# Patient Record
Sex: MALE | Race: BLACK OR AFRICAN AMERICAN | NOT HISPANIC OR LATINO | Employment: UNEMPLOYED | URBAN - METROPOLITAN AREA
[De-identification: names, ages, dates, MRNs, and addresses within clinical notes are randomized per-mention and may not be internally consistent; named-entity substitution may affect disease eponyms.]

---

## 2017-09-18 ENCOUNTER — HOSPITAL ENCOUNTER (EMERGENCY)
Facility: HOSPITAL | Age: 37
Discharge: HOME | End: 2017-09-19
Attending: EMERGENCY MEDICINE
Payer: MEDICAID

## 2017-09-18 DIAGNOSIS — S01.311A LACERATION OF EAR CANAL, RIGHT, INITIAL ENCOUNTER: Primary | ICD-10-CM

## 2017-09-18 PROCEDURE — 99283 EMERGENCY DEPT VISIT LOW MDM: CPT

## 2017-09-18 PROCEDURE — 99283 EMERGENCY DEPT VISIT LOW MDM: CPT | Mod: ,,, | Performed by: EMERGENCY MEDICINE

## 2017-09-18 RX ORDER — DOCUSATE SODIUM 50 MG/5ML
100 LIQUID ORAL ONCE
Status: COMPLETED | OUTPATIENT
Start: 2017-09-19 | End: 2017-09-18

## 2017-09-18 RX ADMIN — DOCUSATE SODIUM 100 MG: 50 LIQUID ORAL at 11:09

## 2017-09-19 VITALS
OXYGEN SATURATION: 99 % | BODY MASS INDEX: 21.66 KG/M2 | HEART RATE: 82 BPM | HEIGHT: 65 IN | DIASTOLIC BLOOD PRESSURE: 88 MMHG | SYSTOLIC BLOOD PRESSURE: 138 MMHG | WEIGHT: 130 LBS | RESPIRATION RATE: 18 BRPM | TEMPERATURE: 98 F

## 2017-09-19 PROCEDURE — 25000003 PHARM REV CODE 250: Performed by: STUDENT IN AN ORGANIZED HEALTH CARE EDUCATION/TRAINING PROGRAM

## 2017-09-19 NOTE — ED PROVIDER NOTES
Encounter Date: 9/18/2017    SCRIBE #1 NOTE: I, Nerissa Burton, am scribing for, and in the presence of,  Dr. Schaefer. I have scribed the following portions of the note - the Resident attestation.       History     Chief Complaint   Patient presents with    Otitis Media     c/o right ear pain since yesterday.      HPI   37 y.o. M p/w right ear pain x2 days. Pain does not radiate, it is sharp. He tried to remove some ear wax with a q-tip yesterday, and thinks that he may have injured the ear.  Denies any changes in hearing, discharge, tinnitus, fevers, vision or balance issues, headache.     Review of patient's allergies indicates:  No Known Allergies  History reviewed. No pertinent past medical history.  History reviewed. No pertinent surgical history.  No family history on file.  Social History   Substance Use Topics    Smoking status: Never Smoker    Smokeless tobacco: Not on file    Alcohol use No     Review of Systems   Constitutional: Negative for chills, diaphoresis and fever.   HENT: Positive for ear pain. Negative for congestion, ear discharge, hearing loss, rhinorrhea, sore throat and trouble swallowing.    Eyes: Negative for pain and visual disturbance.   Respiratory: Negative for cough and chest tightness.    Cardiovascular: Negative for chest pain and palpitations.   Gastrointestinal: Negative for blood in stool and constipation.   Genitourinary: Negative for dysuria, frequency and hematuria.   Musculoskeletal: Negative for back pain and neck stiffness.   Skin: Negative for rash and wound.   Neurological: Negative for seizures, syncope and weakness.   All other systems reviewed and are negative.      Physical Exam     Initial Vitals [09/18/17 2139]   BP Pulse Resp Temp SpO2   127/82 96 18 98.3 °F (36.8 °C) 97 %      MAP       97         Physical Exam    Constitutional: He appears well-developed. He is not diaphoretic. No distress.   HENT:   Head: Normocephalic and atraumatic.   Nose: Nose normal.    Mouth/Throat: Oropharynx is clear and moist.   Right ear with blood in canal. There is cerumen impaction in both ears.    Eyes: Conjunctivae and EOM are normal. Pupils are equal, round, and reactive to light.   Neck: Normal range of motion. Neck supple.   Cardiovascular: Intact distal pulses.   Pulses:       Dorsalis pedis pulses are 2+ on the right side, and 2+ on the left side.   Pulmonary/Chest: Breath sounds normal. No respiratory distress. He exhibits no tenderness.   Abdominal: Soft. Normal appearance. He exhibits no distension.   Neurological: He is alert and oriented to person, place, and time. He has normal strength. No cranial nerve deficit or sensory deficit. He exhibits normal muscle tone.   Skin: Skin is warm and dry. No pallor.         ED Course   Procedures  Labs Reviewed - No data to display     HO-II MDM  37 y.o. M p/w right ear pain x2 days. No other sx's. Phys exam showed blood and cerumen.   DDX includes: cerumen impaction, perforated TM, otitis media, otitis externa.  Will wash out ear and attempt to remove cerumen.    Pt is aware of plan and is amenable.     Jose Albarran M.D.  [facility] EMERGENCY MEDICINE PGY-2  11:29 PM 09/19/2017    HO-II UPDATE  Cerumen not able to be completely removed. Right ear showed bleeding in the external canal, inferior dorsal ~7 o'clock location.   At this time, we have low concern for TM perforation. Will give pt contact info for ENT for more thorough evaluation.   Pt is aware of plan and is amenable.     Jose Albarran M.D.  [facility] EMERGENCY MEDICINE PGY-2  1:02 AM 09/19/2017         Medical Decision Making:   History:   Old Medical Records: I decided to obtain old medical records.            Scribe Attestation:   Scribe #1: I performed the above scribed service and the documentation accurately describes the services I performed. I attest to the accuracy of the note.    Attending Attestation:   Physician Attestation Statement for Resident:  As the supervising MD    Physician Attestation Statement: I have personally seen and examined this patient.   I agree with the above history. -: 38 yo M presents with 2 days R ear fullness and decreased hearing. He tried to clean his ear and noticed blood uncertain of last tetanus. Pt has bilateral cerumen impaction w/ 3mm superficial laceration along inferior aspect of R auditory canal. We attempted cerumen disimpaction with colace with some success in the ED but pt will need definitive management in ENT clinic given close proximity of laceration to the TM. I do not believe there to be TM perforation but unable to completely visualize TM. Pt will f/u in ENT clinic. Tetanus updated.   As the supervising MD I agree with the above PE.    As the supervising MD I agree with the above treatment, course, plan, and disposition.          Physician Attestation for Scribe:  Physician Attestation Statement for Scribe #1: I, Dr. Schaefer, reviewed documentation, as scribed by Nerissa Burton in my presence, and it is both accurate and complete.                 ED Course      Clinical Impression:   The encounter diagnosis was Laceration of ear canal, right, initial encounter.                           Jose Albarran MD  Resident  09/19/17 0107       Evan Schaefer MD  09/19/17 0216

## 2017-09-19 NOTE — ED TRIAGE NOTES
Pt presents to ED c/o decreased hearing and pressure in right ear. Started today. Denies any PMH.     LOC: Patient name and date of birth verified.  The patient is awake, alert and aware of environment with an appropriate affect, the patient is oriented x 3 and speaking appropriately.  Pt in NAD.    APPEARANCE: Patient resting comfortably and in no acute distress, patient is clean and well groomed, patient's clothing is properly fastened.  SKIN: The skin is warm and dry, color consistent with ethnicity, patient has normal skin turgor and moist mucus membranes, skin intact, no breakdown or brusing noted.  MUSCULOSKELETAL: Patient moving all extremities well, no obvious swelling or deformities noted.  RESPIRATORY: Airway is open and patent, respirations are spontaneous, patient has a normal effort and rate, no accessory muscle use noted.  CARDIAC: Patient has a normal rate and rhythm, no periphreal edema noted, capillary refill < 3 seconds.  ABDOMEN: Soft and non tender to palpation, no distention noted. Bowel sounds present in all four quadrants.  NEUROLOGIC: Eyes open spontaneously, behavior appropriate to situation, follows commands, facial expression symmetrical, bilateral hand grasp equal and even, purposeful motor response noted, normal sensation in all extremities when touched with a finger.;

## 2017-09-21 ENCOUNTER — TELEPHONE (OUTPATIENT)
Dept: OTOLARYNGOLOGY | Facility: CLINIC | Age: 37
End: 2017-09-21

## 2017-09-21 NOTE — TELEPHONE ENCOUNTER
----- Message from Noemy Robertson sent at 9/19/2017  2:06 PM CDT -----  Contact: Ms Solares/ wife   [phone]  Patient went to ER for cut in the ear  pt advised to see  ENT.   Patient need appointment  For today   Please call Ms Solares/ wife   [phone]

## 2018-02-15 ENCOUNTER — HOSPITAL ENCOUNTER (EMERGENCY)
Facility: HOSPITAL | Age: 38
Discharge: HOME | End: 2018-02-15
Attending: EMERGENCY MEDICINE
Payer: MEDICAID

## 2018-02-15 VITALS
HEIGHT: 65 IN | RESPIRATION RATE: 18 BRPM | SYSTOLIC BLOOD PRESSURE: 120 MMHG | OXYGEN SATURATION: 99 % | DIASTOLIC BLOOD PRESSURE: 71 MMHG | HEART RATE: 70 BPM | BODY MASS INDEX: 21.66 KG/M2 | TEMPERATURE: 98 F | WEIGHT: 130 LBS

## 2018-02-15 DIAGNOSIS — R40.20 LOC (LOSS OF CONSCIOUSNESS): ICD-10-CM

## 2018-02-15 LAB
ALBUMIN SERPL BCP-MCNC: 3.6 G/DL
ALP SERPL-CCNC: 77 U/L
ALT SERPL W/O P-5'-P-CCNC: 14 U/L
AMPHET+METHAMPHET UR QL: NEGATIVE
ANION GAP SERPL CALC-SCNC: 7 MMOL/L
AST SERPL-CCNC: 19 U/L
BARBITURATES UR QL SCN>200 NG/ML: NEGATIVE
BASOPHILS # BLD AUTO: 0.03 K/UL
BASOPHILS NFR BLD: 0.4 %
BENZODIAZ UR QL SCN>200 NG/ML: NEGATIVE
BILIRUB SERPL-MCNC: 0.4 MG/DL
BUN SERPL-MCNC: 8 MG/DL
BZE UR QL SCN: NEGATIVE
CALCIUM SERPL-MCNC: 9.2 MG/DL
CANNABINOIDS UR QL SCN: NEGATIVE
CHLORIDE SERPL-SCNC: 105 MMOL/L
CO2 SERPL-SCNC: 28 MMOL/L
CREAT SERPL-MCNC: 0.9 MG/DL
CREAT UR-MCNC: 41 MG/DL
DIFFERENTIAL METHOD: ABNORMAL
EOSINOPHIL # BLD AUTO: 0.3 K/UL
EOSINOPHIL NFR BLD: 4.4 %
ERYTHROCYTE [DISTWIDTH] IN BLOOD BY AUTOMATED COUNT: 13 %
EST. GFR  (AFRICAN AMERICAN): >60 ML/MIN/1.73 M^2
EST. GFR  (NON AFRICAN AMERICAN): >60 ML/MIN/1.73 M^2
GLUCOSE SERPL-MCNC: 103 MG/DL
HCT VFR BLD AUTO: 37 %
HGB BLD-MCNC: 12.5 G/DL
IMM GRANULOCYTES # BLD AUTO: 0.02 K/UL
IMM GRANULOCYTES NFR BLD AUTO: 0.3 %
LYMPHOCYTES # BLD AUTO: 2.2 K/UL
LYMPHOCYTES NFR BLD: 28.8 %
MCH RBC QN AUTO: 28.7 PG
MCHC RBC AUTO-ENTMCNC: 33.8 G/DL
MCV RBC AUTO: 85 FL
METHADONE UR QL SCN>300 NG/ML: NEGATIVE
MONOCYTES # BLD AUTO: 0.6 K/UL
MONOCYTES NFR BLD: 7.8 %
NEUTROPHILS # BLD AUTO: 4.5 K/UL
NEUTROPHILS NFR BLD: 58.3 %
NRBC BLD-RTO: 0 /100 WBC
OPIATES UR QL SCN: NEGATIVE
PCP UR QL SCN>25 NG/ML: NEGATIVE
PLATELET # BLD AUTO: 183 K/UL
PMV BLD AUTO: 12.1 FL
POTASSIUM SERPL-SCNC: 3.8 MMOL/L
PROT SERPL-MCNC: 6.9 G/DL
RBC # BLD AUTO: 4.35 M/UL
SODIUM SERPL-SCNC: 140 MMOL/L
TOXICOLOGY INFORMATION: NORMAL
WBC # BLD AUTO: 7.68 K/UL

## 2018-02-15 PROCEDURE — 93010 ELECTROCARDIOGRAM REPORT: CPT | Mod: ,,, | Performed by: INTERNAL MEDICINE

## 2018-02-15 PROCEDURE — 99284 EMERGENCY DEPT VISIT MOD MDM: CPT | Mod: 25

## 2018-02-15 PROCEDURE — 80307 DRUG TEST PRSMV CHEM ANLYZR: CPT

## 2018-02-15 PROCEDURE — 99284 EMERGENCY DEPT VISIT MOD MDM: CPT | Mod: ,,, | Performed by: NURSE PRACTITIONER

## 2018-02-15 PROCEDURE — 85025 COMPLETE CBC W/AUTO DIFF WBC: CPT

## 2018-02-15 PROCEDURE — 80053 COMPREHEN METABOLIC PANEL: CPT

## 2018-02-15 PROCEDURE — 93005 ELECTROCARDIOGRAM TRACING: CPT

## 2018-02-15 NOTE — ED NOTES
Received pt  Pt awake, alert, and oriented x4  Pt reports syncope episode, about 2 days ago  Pt has no recollection of the event, but his wife says it lasted 10 minutes, and denies injury  Pt placed on cardiac monitor and continuous pulse ox  Pt pending MSE  Will continue to monitor

## 2018-02-15 NOTE — ED PROVIDER NOTES
Encounter Date: 2/15/2018    SCRIBE #1 NOTE: I, Silvia Brady, am scribing for, and in the presence of,  Dr. Hopkins . I have scribed the following portions of the note - the EKG reading.       History     Chief Complaint   Patient presents with    Loss of Consciousness     passed out 2 days ago, wife thinks speech is slower, ? seizure     The history is provided by the patient and the spouse.   Loss of Consciousness   This is a new problem. The current episode started 2 days ago. Episode frequency: once. The problem has been resolved. Pertinent negatives include no chest pain, no abdominal pain, no headaches and no shortness of breath. Nothing aggravates the symptoms. Nothing relieves the symptoms. He has tried nothing for the symptoms.     Review of patient's allergies indicates:  No Known Allergies  History reviewed. No pertinent past medical history.  History reviewed. No pertinent surgical history.  History reviewed. No pertinent family history.  Social History   Substance Use Topics    Smoking status: Never Smoker    Smokeless tobacco: Never Used    Alcohol use No     Review of Systems   Constitutional: Negative for fever.   HENT: Negative for sore throat.    Respiratory: Negative for shortness of breath.    Cardiovascular: Positive for syncope. Negative for chest pain.   Gastrointestinal: Negative for abdominal pain and nausea.   Genitourinary: Negative for dysuria.   Musculoskeletal: Negative for back pain.   Skin: Negative for rash.   Neurological: Positive for syncope. Negative for dizziness, tremors, facial asymmetry, speech difficulty, weakness, light-headedness, numbness and headaches.   Hematological: Does not bruise/bleed easily.       Physical Exam     Initial Vitals [02/15/18 1500]   BP Pulse Resp Temp SpO2   115/67 80 18 98.1 °F (36.7 °C) 99 %      MAP       83         Physical Exam    Nursing note and vitals reviewed.  Constitutional: Vital signs are normal. He appears well-developed and  well-nourished.  Non-toxic appearance.   HENT:   Head: Normocephalic and atraumatic.   Right Ear: Hearing and external ear normal.   Left Ear: Hearing and external ear normal.   Nose: Nose normal.   Mouth/Throat: Oropharynx is clear and moist.   Eyes: Conjunctivae are normal. Pupils are equal, round, and reactive to light. Right eye exhibits nystagmus. Left eye exhibits nystagmus. Right pupil is round and reactive. Left pupil is round and reactive. Pupils are equal.   Neck: Full passive range of motion without pain. Neck supple. No neck rigidity.   Cardiovascular: Normal rate and normal pulses.   Pulses:       Radial pulses are 2+ on the right side, and 2+ on the left side.        Dorsalis pedis pulses are 2+ on the right side, and 2+ on the left side.   Pulmonary/Chest: Effort normal and breath sounds normal. No respiratory distress. He has no decreased breath sounds. He has no wheezes.   Abdominal: Soft. Bowel sounds are normal. There is no tenderness. There is no rigidity, no rebound and no guarding.   Musculoskeletal: Normal range of motion.   Neurological: He is alert and oriented to person, place, and time. He has normal strength. No cranial nerve deficit or sensory deficit. He exhibits normal muscle tone. He displays no seizure activity. Gait normal. GCS eye subscore is 4. GCS verbal subscore is 5. GCS motor subscore is 6.   Skin: Skin is warm, dry and intact. Capillary refill takes less than 2 seconds. No rash noted.   Psychiatric: He has a normal mood and affect. His speech is normal and behavior is normal. Judgment and thought content normal. Cognition and memory are normal.         ED Course   Procedures  Labs Reviewed   CBC W/ AUTO DIFFERENTIAL - Abnormal; Notable for the following:        Result Value    RBC 4.35 (*)     Hemoglobin 12.5 (*)     Hematocrit 37.0 (*)     All other components within normal limits   COMPREHENSIVE METABOLIC PANEL - Abnormal; Notable for the following:     Anion Gap 7 (*)      "All other components within normal limits   DRUG SCREEN PANEL, URINE EMERGENCY    Narrative:     yellow & gray tubes     EKG Readings: (Independently Interpreted)   Sinus bradycardia rate of 55. No ischemic changes.           Medical Decision Making:   History:   Old Medical Records: I decided to obtain old medical records.  Differential Diagnosis:   Seizure  Syncopal Episode  Substance Abuse  Hypoglycemia  Dysrhythmia  Infection  Intracranial Injury  Independently Interpreted Test(s):   I have ordered and independently interpreted EKG Reading(s) - see prior notes  Clinical Tests:   Lab Tests: Ordered and Reviewed  Radiological Study: Ordered and Reviewed  Medical Tests: Ordered and Reviewed  ED Management:  Patient is a 37-year-old male with no pertinent past medical history who presents to the ED with his spouse complaining of a loss of consciousness that occurred 2 days ago. The patient is afebrile, non-toxic appearing, and hemodynamically stable. Patient's spouse states that they were at home and he was sitting down when she noticed "his eyes roll back in his head and his whole body started shaking".  She reports the episode lasted approximately 5-10 minutes followed by 2 episodes of vomiting when the patient regained consciousness.  She further reports that he was "very sleepy" afterward.  They both deny any similar episodes in the past.  Deny recreational substance use.  Patient denies pain and states that he "feels fine".  Wife states that she believes he has been "acting slow and not quite right" since the episode.     Physical exam is notable for some bilateral horizontal nystagmus.  Physical exam is otherwise benign.    ECG does not demonstrate acute ischemia or abnormality. I independently reviewed and interpreted labs which are unremarkable.  UDS is negative.  CT of head is negative.    Based on history and physical exam, as well as diagnostic results, I considered but do not suspect hypoglycemia, " dysrhythmia, infection, or intracranial injury.  Clinical presentation suggests possible seizure versus syncopal episode.  Patient will be discharged with instructions to follow up with neurology.  Return precautions given. Case discussed with supervising physician who agrees with plan.            Scribe Attestation:   Scribe #1: I performed the above scribed service and the documentation accurately describes the services I performed. I attest to the accuracy of the note.    Attending Attestation:     Physician Attestation Statement for NP/PA:   I reviewed the chart but I did not personally examine the patient. The face to face encounter was performed by the NP/PA.    Other NP/PA Attestation Additions:      Medical Decision Making: New onset seizure vs syncope    Labs and CT head normal.  Will not start on AEDs.  EKG negative.  Pt to follow up with neurology       Physician Attestation for Scribe:      Comments: I, Dr. Karis Hopkins, personally performed the services described in this documentation. All medical record entries made by the scribe were at my direction and in my presence.  I have reviewed the chart and agree that the record reflects my personal performance and is accurate and complete. Karis Hopkins MD.                 Clinical Impression:   The encounter diagnosis was LOC (loss of consciousness).    Disposition:   Disposition: Discharged  Condition: Stable                        Zenia Guzman NP  02/15/18 1926       Karis Hopkins MD  02/16/18 7581

## 2018-07-11 ENCOUNTER — HOSPITAL ENCOUNTER (EMERGENCY)
Facility: HOSPITAL | Age: 38
Discharge: HOME | End: 2018-07-11
Attending: EMERGENCY MEDICINE
Payer: MEDICAID

## 2018-07-11 VITALS
BODY MASS INDEX: 21.66 KG/M2 | HEIGHT: 65 IN | WEIGHT: 130 LBS | SYSTOLIC BLOOD PRESSURE: 114 MMHG | TEMPERATURE: 98 F | HEART RATE: 73 BPM | DIASTOLIC BLOOD PRESSURE: 78 MMHG | OXYGEN SATURATION: 98 % | RESPIRATION RATE: 16 BRPM

## 2018-07-11 DIAGNOSIS — H61.23 BILATERAL IMPACTED CERUMEN: Primary | ICD-10-CM

## 2018-07-11 PROCEDURE — 99283 EMERGENCY DEPT VISIT LOW MDM: CPT | Mod: 25

## 2018-07-11 PROCEDURE — 25000003 PHARM REV CODE 250: Performed by: EMERGENCY MEDICINE

## 2018-07-11 PROCEDURE — 69209 REMOVE IMPACTED EAR WAX UNI: CPT

## 2018-07-11 PROCEDURE — 69210 REMOVE IMPACTED EAR WAX UNI: CPT | Mod: 50

## 2018-07-11 RX ADMIN — CARBAMIDE PEROXIDE 6.5% 10 DROP: 6.5 LIQUID AURICULAR (OTIC) at 07:07

## 2018-07-12 NOTE — ED NOTES
Patient identifiers for Charo Padron checked and correct.    LOC: The patient is awake, alert and aware of environment with an appropriate affect, the patient is oriented x 3 and speaking appropriately.  APPEARANCE: Patient resting comfortably and in no acute distress, patient is clean and well groomed, patient's clothing are properly fastened.  SKIN: The skin is warm and dry, patient has age appropriate skin turgor and moist mucus membranes, skin intact, no breakdown or bruising noted.  MUSCULOSKELETAL: Patient moving all extremities well, no obvious swelling or deformities noted.  RESPIRATORY: Airway is open and patent, respirations are spontaneous, patient has a normal effort and rate, no accessory muscle use noted.  Clear breath sounds bilaterally.  CARDIAC: Patient has a normal rate and rhythm, no periphreal edema noted, capillary refill < 3 seconds.  ABDOMEN: Soft and non tender to palpation, no distention noted.  Normoactive bowel sounds x4.  NEUROLOGIC: PERRL, facial expression is symmetrical, bilateral hand grasp equal and even, normal sensation in all extremities when touched with a finger.

## 2018-07-12 NOTE — ED PROVIDER NOTES
Encounter Date: 7/11/2018    SCRIBE #1 NOTE: I, Garry Lindsay, am scribing for, and in the presence of,  Dr. Olson. I have scribed the entire note.       History     Chief Complaint   Patient presents with    Ears Stopped Up     bilateral, onset today     Charo Padron is a 37 y.o. male who  has no past medical history on file.    The patient presents to the ED due to bilateral ears being stopped up that began today.  He states that he has had ear infections before but does not know the cause. He denies any sore throat, congestion, or fever.       The history is provided by the patient.     Review of patient's allergies indicates:  No Known Allergies  History reviewed. No pertinent past medical history.  History reviewed. No pertinent surgical history.  History reviewed. No pertinent family history.  Social History   Substance Use Topics    Smoking status: Never Smoker    Smokeless tobacco: Never Used    Alcohol use No     Review of Systems   Constitutional: Negative for fever.   HENT: Positive for ear pain. Negative for congestion and sore throat.        Physical Exam     Initial Vitals [07/11/18 1811]   BP Pulse Resp Temp SpO2   116/75 68 16 98.3 °F (36.8 °C) 100 %      MAP       --         Physical Exam    Nursing note and vitals reviewed.  Constitutional: He appears well-developed and well-nourished. He is not diaphoretic. No distress.   HENT:   Head: Normocephalic and atraumatic.   Mouth/Throat: Oropharynx is clear and moist.   (+) Bilateral ear occlusion   Eyes: EOM are normal. Pupils are equal, round, and reactive to light.   Neck: No tracheal deviation present.   Cardiovascular: Normal rate, regular rhythm, normal heart sounds and intact distal pulses.   Pulmonary/Chest: Breath sounds normal. No stridor. No respiratory distress.   Abdominal: Soft. He exhibits no distension and no mass. There is no tenderness.   Musculoskeletal: Normal range of motion. He exhibits no edema.   Neurological: He is alert  and oriented to person, place, and time. No cranial nerve deficit or sensory deficit.   Skin: Skin is warm and dry. Capillary refill takes less than 2 seconds. No rash noted.   Psychiatric: He has a normal mood and affect. His behavior is normal. Thought content normal.         ED Course   Ear Wax Removal  Date/Time: 7/11/2018 8:16 PM  Performed by: GUERRERO OLSON  Authorized by: GUERRERO OLSON     Anesthesia:  Local Anesthetic: none  Ceruminolytics applied prior to the procedure.  Medication Used: Cerumenex.  Location details: both ears  Procedure type: irrigation Cerumen Removal Results: Cerumen completely removed.  Patient tolerance: Patient tolerated the procedure well with no immediate complications        Labs Reviewed - No data to display       Imaging Results    None                               Clinical Impression:     1. Bilateral impacted cerumen         I, Dr. Guerrero Olson, personally performed the services described in this documentation.   All medical record entries made by the scribe were at my direction and in my presence.   I have reviewed the chart and agree that the record is accurate and complete.   Guerrero Olson MD.  10:03 PM 07/11/2018                     Guerrero Olson MD  07/11/18 4308

## 2019-02-08 ENCOUNTER — OCCUPATIONAL HEALTH (OUTPATIENT)
Dept: URGENT CARE | Facility: CLINIC | Age: 39
End: 2019-02-08
Payer: MEDICAID

## 2019-02-08 DIAGNOSIS — Z02.83 ENCOUNTER FOR DRUG SCREENING: Primary | ICD-10-CM

## 2019-02-08 PROCEDURE — 80305 PR DRUG SCREEN - 1: ICD-10-PCS | Mod: S$GLB,,, | Performed by: PHYSICIAN ASSISTANT

## 2019-02-08 PROCEDURE — 80305 DRUG TEST PRSMV DIR OPT OBS: CPT | Mod: S$GLB,,, | Performed by: PHYSICIAN ASSISTANT

## 2019-04-18 ENCOUNTER — ANESTHESIA EVENT (OUTPATIENT)
Dept: SURGERY | Facility: HOSPITAL | Age: 39
End: 2019-04-18
Payer: MEDICAID

## 2019-04-18 ENCOUNTER — ANESTHESIA (OUTPATIENT)
Dept: SURGERY | Facility: HOSPITAL | Age: 39
End: 2019-04-18
Payer: MEDICAID

## 2019-04-18 ENCOUNTER — HOSPITAL ENCOUNTER (OUTPATIENT)
Facility: HOSPITAL | Age: 39
Discharge: HOME | End: 2019-04-20
Attending: EMERGENCY MEDICINE | Admitting: ORTHOPAEDIC SURGERY
Payer: MEDICAID
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
## 2019-04-18 DIAGNOSIS — S02.2XXA CLOSED FRACTURE OF NASAL BONE, INITIAL ENCOUNTER: ICD-10-CM

## 2019-04-18 DIAGNOSIS — S92.002A CLOSED DISPLACED FRACTURE OF LEFT CALCANEUS, UNSPECIFIED PORTION OF CALCANEUS, INITIAL ENCOUNTER: ICD-10-CM

## 2019-04-18 DIAGNOSIS — S82.202A CLOSED FRACTURE OF LEFT TIBIA AND FIBULA, INITIAL ENCOUNTER: ICD-10-CM

## 2019-04-18 DIAGNOSIS — S01.511A LIP LACERATION, INITIAL ENCOUNTER: ICD-10-CM

## 2019-04-18 DIAGNOSIS — S82.402A CLOSED FRACTURE OF LEFT TIBIA AND FIBULA, INITIAL ENCOUNTER: ICD-10-CM

## 2019-04-18 DIAGNOSIS — S82.842A CLOSED BIMALLEOLAR FRACTURE OF LEFT ANKLE, INITIAL ENCOUNTER: ICD-10-CM

## 2019-04-18 PROBLEM — S82.843A BIMALLEOLAR ANKLE FRACTURE: Status: ACTIVE | Noted: 2019-04-18

## 2019-04-18 LAB
ALBUMIN SERPL BCP-MCNC: 3.5 G/DL (ref 3.5–5.2)
ALP SERPL-CCNC: 77 U/L (ref 55–135)
ALT SERPL W/O P-5'-P-CCNC: 30 U/L (ref 10–44)
ANION GAP SERPL CALC-SCNC: 7 MMOL/L (ref 8–16)
AST SERPL-CCNC: 31 U/L (ref 10–40)
BASOPHILS # BLD AUTO: 0.03 K/UL (ref 0–0.2)
BASOPHILS NFR BLD: 0.2 % (ref 0–1.9)
BILIRUB SERPL-MCNC: 0.4 MG/DL (ref 0.1–1)
BUN SERPL-MCNC: 14 MG/DL (ref 6–20)
CALCIUM SERPL-MCNC: 9.4 MG/DL (ref 8.7–10.5)
CHLORIDE SERPL-SCNC: 107 MMOL/L (ref 95–110)
CO2 SERPL-SCNC: 25 MMOL/L (ref 23–29)
CREAT SERPL-MCNC: 1 MG/DL (ref 0.5–1.4)
DIFFERENTIAL METHOD: ABNORMAL
EOSINOPHIL # BLD AUTO: 0.2 K/UL (ref 0–0.5)
EOSINOPHIL NFR BLD: 1.5 % (ref 0–8)
ERYTHROCYTE [DISTWIDTH] IN BLOOD BY AUTOMATED COUNT: 13 % (ref 11.5–14.5)
EST. GFR  (AFRICAN AMERICAN): >60 ML/MIN/1.73 M^2
EST. GFR  (NON AFRICAN AMERICAN): >60 ML/MIN/1.73 M^2
GLUCOSE SERPL-MCNC: 128 MG/DL (ref 70–110)
HCT VFR BLD AUTO: 37.8 % (ref 40–54)
HGB BLD-MCNC: 12.3 G/DL (ref 14–18)
IMM GRANULOCYTES # BLD AUTO: 0.07 K/UL (ref 0–0.04)
IMM GRANULOCYTES NFR BLD AUTO: 0.5 % (ref 0–0.5)
LYMPHOCYTES # BLD AUTO: 2.8 K/UL (ref 1–4.8)
LYMPHOCYTES NFR BLD: 19.6 % (ref 18–48)
MCH RBC QN AUTO: 29.4 PG (ref 27–31)
MCHC RBC AUTO-ENTMCNC: 32.5 G/DL (ref 32–36)
MCV RBC AUTO: 90 FL (ref 82–98)
MONOCYTES # BLD AUTO: 0.8 K/UL (ref 0.3–1)
MONOCYTES NFR BLD: 5.7 % (ref 4–15)
NEUTROPHILS # BLD AUTO: 10.4 K/UL (ref 1.8–7.7)
NEUTROPHILS NFR BLD: 72.5 % (ref 38–73)
NRBC BLD-RTO: 0 /100 WBC
PLATELET # BLD AUTO: 245 K/UL (ref 150–350)
PMV BLD AUTO: 11.2 FL (ref 9.2–12.9)
POCT GLUCOSE: 122 MG/DL (ref 70–110)
POTASSIUM SERPL-SCNC: 3.7 MMOL/L (ref 3.5–5.1)
PROT SERPL-MCNC: 7 G/DL (ref 6–8.4)
RBC # BLD AUTO: 4.19 M/UL (ref 4.6–6.2)
SODIUM SERPL-SCNC: 139 MMOL/L (ref 136–145)
WBC # BLD AUTO: 14.35 K/UL (ref 3.9–12.7)

## 2019-04-18 PROCEDURE — 99900035 HC TECH TIME PER 15 MIN (STAT)

## 2019-04-18 PROCEDURE — 27810 PR CLOSED TX BIMALLEOLAR ANKLE FRACTURE W MANIP: ICD-10-PCS | Mod: 51,LT,, | Performed by: ORTHOPAEDIC SURGERY

## 2019-04-18 PROCEDURE — 63600175 PHARM REV CODE 636 W HCPCS: Performed by: NURSE ANESTHETIST, CERTIFIED REGISTERED

## 2019-04-18 PROCEDURE — 71000039 HC RECOVERY, EACH ADD'L HOUR: Performed by: ORTHOPAEDIC SURGERY

## 2019-04-18 PROCEDURE — 63600175 PHARM REV CODE 636 W HCPCS: Performed by: PHYSICIAN ASSISTANT

## 2019-04-18 PROCEDURE — 25000003 PHARM REV CODE 250: Performed by: PHYSICIAN ASSISTANT

## 2019-04-18 PROCEDURE — D9220A PRA ANESTHESIA: Mod: CRNA,,, | Performed by: NURSE ANESTHETIST, CERTIFIED REGISTERED

## 2019-04-18 PROCEDURE — D9220A PRA ANESTHESIA: ICD-10-PCS | Mod: CRNA,,, | Performed by: NURSE ANESTHETIST, CERTIFIED REGISTERED

## 2019-04-18 PROCEDURE — 99291 CRITICAL CARE FIRST HOUR: CPT | Mod: 25

## 2019-04-18 PROCEDURE — D9220A PRA ANESTHESIA: Mod: ANES,,, | Performed by: ANESTHESIOLOGY

## 2019-04-18 PROCEDURE — 36000708 HC OR TIME LEV III 1ST 15 MIN: Performed by: ORTHOPAEDIC SURGERY

## 2019-04-18 PROCEDURE — 94761 N-INVAS EAR/PLS OXIMETRY MLT: CPT

## 2019-04-18 PROCEDURE — 36000709 HC OR TIME LEV III EA ADD 15 MIN: Performed by: ORTHOPAEDIC SURGERY

## 2019-04-18 PROCEDURE — 85025 COMPLETE CBC W/AUTO DIFF WBC: CPT

## 2019-04-18 PROCEDURE — C1713 ANCHOR/SCREW BN/BN,TIS/BN: HCPCS | Performed by: ORTHOPAEDIC SURGERY

## 2019-04-18 PROCEDURE — 96361 HYDRATE IV INFUSION ADD-ON: CPT | Mod: 59

## 2019-04-18 PROCEDURE — 27201423 OPTIME MED/SURG SUP & DEVICES STERILE SUPPLY: Performed by: ORTHOPAEDIC SURGERY

## 2019-04-18 PROCEDURE — 99291 PR CRITICAL CARE, E/M 30-74 MINUTES: ICD-10-PCS | Mod: 25,,, | Performed by: EMERGENCY MEDICINE

## 2019-04-18 PROCEDURE — 80053 COMPREHEN METABOLIC PANEL: CPT

## 2019-04-18 PROCEDURE — 99499 UNLISTED E&M SERVICE: CPT | Mod: ,,, | Performed by: OTOLARYNGOLOGY

## 2019-04-18 PROCEDURE — 96375 TX/PRO/DX INJ NEW DRUG ADDON: CPT

## 2019-04-18 PROCEDURE — 01480 ANES OPEN PX LOWER L/A/F NOS: CPT | Performed by: ORTHOPAEDIC SURGERY

## 2019-04-18 PROCEDURE — 99291 CRITICAL CARE FIRST HOUR: CPT | Mod: 25,,, | Performed by: EMERGENCY MEDICINE

## 2019-04-18 PROCEDURE — 27810 TREATMENT OF ANKLE FRACTURE: CPT | Mod: 51,LT,, | Performed by: ORTHOPAEDIC SURGERY

## 2019-04-18 PROCEDURE — 37000008 HC ANESTHESIA 1ST 15 MINUTES: Performed by: ORTHOPAEDIC SURGERY

## 2019-04-18 PROCEDURE — 63600175 PHARM REV CODE 636 W HCPCS: Performed by: EMERGENCY MEDICINE

## 2019-04-18 PROCEDURE — 96374 THER/PROPH/DIAG INJ IV PUSH: CPT

## 2019-04-18 PROCEDURE — 37000009 HC ANESTHESIA EA ADD 15 MINS: Performed by: ORTHOPAEDIC SURGERY

## 2019-04-18 PROCEDURE — 25000003 PHARM REV CODE 250: Performed by: STUDENT IN AN ORGANIZED HEALTH CARE EDUCATION/TRAINING PROGRAM

## 2019-04-18 PROCEDURE — G0378 HOSPITAL OBSERVATION PER HR: HCPCS

## 2019-04-18 PROCEDURE — 71000033 HC RECOVERY, INTIAL HOUR: Performed by: ORTHOPAEDIC SURGERY

## 2019-04-18 PROCEDURE — 12011 RPR F/E/E/N/L/M 2.5 CM/<: CPT

## 2019-04-18 PROCEDURE — 99499 NO LOS: ICD-10-PCS | Mod: ,,, | Performed by: OTOLARYNGOLOGY

## 2019-04-18 PROCEDURE — 96376 TX/PRO/DX INJ SAME DRUG ADON: CPT

## 2019-04-18 PROCEDURE — 25000003 PHARM REV CODE 250: Performed by: NURSE ANESTHETIST, CERTIFIED REGISTERED

## 2019-04-18 PROCEDURE — C1769 GUIDE WIRE: HCPCS | Performed by: ORTHOPAEDIC SURGERY

## 2019-04-18 PROCEDURE — D9220A PRA ANESTHESIA: ICD-10-PCS | Mod: ANES,,, | Performed by: ANESTHESIOLOGY

## 2019-04-18 PROCEDURE — 12011 RPR F/E/E/N/L/M 2.5 CM/<: CPT | Mod: ,,, | Performed by: PHYSICIAN ASSISTANT

## 2019-04-18 PROCEDURE — 28415 OPTX CALCANEAL FRACTURE: CPT | Mod: LT,,, | Performed by: ORTHOPAEDIC SURGERY

## 2019-04-18 PROCEDURE — 12011 PR RESUPERF WND FACE <2.5 CM: ICD-10-PCS | Mod: ,,, | Performed by: PHYSICIAN ASSISTANT

## 2019-04-18 PROCEDURE — 28415 PR OPEN TREATMENT CALCANEAL FRACTURE: ICD-10-PCS | Mod: LT,,, | Performed by: ORTHOPAEDIC SURGERY

## 2019-04-18 PROCEDURE — 82962 GLUCOSE BLOOD TEST: CPT

## 2019-04-18 DEVICE — SCREW CANN 16MM X 75MM: Type: IMPLANTABLE DEVICE | Site: ANKLE | Status: FUNCTIONAL

## 2019-04-18 DEVICE — SCREW CANN 16MM X 80MM: Type: IMPLANTABLE DEVICE | Site: ANKLE | Status: FUNCTIONAL

## 2019-04-18 RX ORDER — ACETAMINOPHEN 325 MG/1
650 TABLET ORAL EVERY 8 HOURS PRN
Status: DISCONTINUED | OUTPATIENT
Start: 2019-04-18 | End: 2019-04-20 | Stop reason: HOSPADM

## 2019-04-18 RX ORDER — HYDROMORPHONE HYDROCHLORIDE 2 MG/ML
INJECTION, SOLUTION INTRAMUSCULAR; INTRAVENOUS; SUBCUTANEOUS
Status: DISCONTINUED | OUTPATIENT
Start: 2019-04-18 | End: 2019-04-18

## 2019-04-18 RX ORDER — DEXAMETHASONE SODIUM PHOSPHATE 4 MG/ML
INJECTION, SOLUTION INTRA-ARTICULAR; INTRALESIONAL; INTRAMUSCULAR; INTRAVENOUS; SOFT TISSUE
Status: DISCONTINUED | OUTPATIENT
Start: 2019-04-18 | End: 2019-04-18

## 2019-04-18 RX ORDER — FENTANYL CITRATE 50 UG/ML
INJECTION, SOLUTION INTRAMUSCULAR; INTRAVENOUS
Status: DISCONTINUED | OUTPATIENT
Start: 2019-04-18 | End: 2019-04-18

## 2019-04-18 RX ORDER — SODIUM CHLORIDE 0.9 % (FLUSH) 0.9 %
10 SYRINGE (ML) INJECTION
Status: DISCONTINUED | OUTPATIENT
Start: 2019-04-18 | End: 2019-04-20 | Stop reason: HOSPADM

## 2019-04-18 RX ORDER — CEFAZOLIN SODIUM 1 G/3ML
2 INJECTION, POWDER, FOR SOLUTION INTRAMUSCULAR; INTRAVENOUS
Status: ACTIVE | OUTPATIENT
Start: 2019-04-18 | End: 2019-04-18

## 2019-04-18 RX ORDER — OXYCODONE HYDROCHLORIDE 5 MG/1
5 TABLET ORAL EVERY 4 HOURS PRN
Status: DISCONTINUED | OUTPATIENT
Start: 2019-04-18 | End: 2019-04-20 | Stop reason: HOSPADM

## 2019-04-18 RX ORDER — SODIUM CHLORIDE 9 MG/ML
INJECTION, SOLUTION INTRAVENOUS CONTINUOUS
Status: DISCONTINUED | OUTPATIENT
Start: 2019-04-18 | End: 2019-04-20 | Stop reason: HOSPADM

## 2019-04-18 RX ORDER — CEFAZOLIN SODIUM 1 G/3ML
1 INJECTION, POWDER, FOR SOLUTION INTRAMUSCULAR; INTRAVENOUS
Status: COMPLETED | OUTPATIENT
Start: 2019-04-18 | End: 2019-04-18

## 2019-04-18 RX ORDER — HYDROMORPHONE HYDROCHLORIDE 1 MG/ML
INJECTION, SOLUTION INTRAMUSCULAR; INTRAVENOUS; SUBCUTANEOUS
Status: DISPENSED
Start: 2019-04-18 | End: 2019-04-19

## 2019-04-18 RX ORDER — ONDANSETRON 8 MG/1
8 TABLET, ORALLY DISINTEGRATING ORAL EVERY 8 HOURS PRN
Status: DISCONTINUED | OUTPATIENT
Start: 2019-04-18 | End: 2019-04-20 | Stop reason: HOSPADM

## 2019-04-18 RX ORDER — SUCCINYLCHOLINE CHLORIDE 20 MG/ML
INJECTION INTRAMUSCULAR; INTRAVENOUS
Status: DISCONTINUED | OUTPATIENT
Start: 2019-04-18 | End: 2019-04-18

## 2019-04-18 RX ORDER — ROCURONIUM BROMIDE 10 MG/ML
INJECTION, SOLUTION INTRAVENOUS
Status: DISCONTINUED | OUTPATIENT
Start: 2019-04-18 | End: 2019-04-18

## 2019-04-18 RX ORDER — HYDROMORPHONE HYDROCHLORIDE 1 MG/ML
0.5 INJECTION, SOLUTION INTRAMUSCULAR; INTRAVENOUS; SUBCUTANEOUS
Status: COMPLETED | OUTPATIENT
Start: 2019-04-18 | End: 2019-04-18

## 2019-04-18 RX ORDER — MORPHINE SULFATE 4 MG/ML
4 INJECTION, SOLUTION INTRAMUSCULAR; INTRAVENOUS EVERY 4 HOURS PRN
Status: DISCONTINUED | OUTPATIENT
Start: 2019-04-18 | End: 2019-04-18

## 2019-04-18 RX ORDER — ONDANSETRON 2 MG/ML
INJECTION INTRAMUSCULAR; INTRAVENOUS
Status: DISCONTINUED | OUTPATIENT
Start: 2019-04-18 | End: 2019-04-18

## 2019-04-18 RX ORDER — RAMELTEON 8 MG/1
8 TABLET ORAL NIGHTLY PRN
Status: DISCONTINUED | OUTPATIENT
Start: 2019-04-18 | End: 2019-04-20 | Stop reason: HOSPADM

## 2019-04-18 RX ORDER — ACETAMINOPHEN 10 MG/ML
INJECTION, SOLUTION INTRAVENOUS
Status: DISCONTINUED | OUTPATIENT
Start: 2019-04-18 | End: 2019-04-18

## 2019-04-18 RX ORDER — MORPHINE SULFATE 2 MG/ML
2 INJECTION, SOLUTION INTRAMUSCULAR; INTRAVENOUS EVERY 4 HOURS PRN
Status: DISCONTINUED | OUTPATIENT
Start: 2019-04-18 | End: 2019-04-20 | Stop reason: HOSPADM

## 2019-04-18 RX ORDER — DIPHENHYDRAMINE HYDROCHLORIDE 50 MG/ML
25 INJECTION INTRAMUSCULAR; INTRAVENOUS EVERY 4 HOURS PRN
Status: DISCONTINUED | OUTPATIENT
Start: 2019-04-18 | End: 2019-04-20 | Stop reason: HOSPADM

## 2019-04-18 RX ORDER — PROPOFOL 10 MG/ML
VIAL (ML) INTRAVENOUS
Status: DISCONTINUED | OUTPATIENT
Start: 2019-04-18 | End: 2019-04-18

## 2019-04-18 RX ORDER — MORPHINE SULFATE 4 MG/ML
4 INJECTION, SOLUTION INTRAMUSCULAR; INTRAVENOUS
Status: COMPLETED | OUTPATIENT
Start: 2019-04-18 | End: 2019-04-18

## 2019-04-18 RX ORDER — LIDOCAINE HYDROCHLORIDE 10 MG/ML
10 INJECTION, SOLUTION EPIDURAL; INFILTRATION; INTRACAUDAL; PERINEURAL
Status: COMPLETED | OUTPATIENT
Start: 2019-04-18 | End: 2019-04-18

## 2019-04-18 RX ORDER — DIAZEPAM 5 MG/1
5 TABLET ORAL ONCE
Status: COMPLETED | OUTPATIENT
Start: 2019-04-18 | End: 2019-04-18

## 2019-04-18 RX ORDER — ONDANSETRON 2 MG/ML
4 INJECTION INTRAMUSCULAR; INTRAVENOUS
Status: COMPLETED | OUTPATIENT
Start: 2019-04-18 | End: 2019-04-18

## 2019-04-18 RX ORDER — LIDOCAINE HCL/PF 100 MG/5ML
SYRINGE (ML) INTRAVENOUS
Status: DISCONTINUED | OUTPATIENT
Start: 2019-04-18 | End: 2019-04-18

## 2019-04-18 RX ORDER — MIDAZOLAM HYDROCHLORIDE 1 MG/ML
INJECTION, SOLUTION INTRAMUSCULAR; INTRAVENOUS
Status: DISCONTINUED | OUTPATIENT
Start: 2019-04-18 | End: 2019-04-18

## 2019-04-18 RX ORDER — KETAMINE HYDROCHLORIDE 10 MG/ML
INJECTION, SOLUTION INTRAMUSCULAR; INTRAVENOUS
Status: DISCONTINUED | OUTPATIENT
Start: 2019-04-18 | End: 2019-04-18

## 2019-04-18 RX ORDER — OXYCODONE HYDROCHLORIDE 10 MG/1
10 TABLET ORAL EVERY 4 HOURS PRN
Status: DISCONTINUED | OUTPATIENT
Start: 2019-04-18 | End: 2019-04-20 | Stop reason: HOSPADM

## 2019-04-18 RX ORDER — CEFAZOLIN SODIUM 1 G/3ML
INJECTION, POWDER, FOR SOLUTION INTRAMUSCULAR; INTRAVENOUS
Status: DISCONTINUED | OUTPATIENT
Start: 2019-04-18 | End: 2019-04-18

## 2019-04-18 RX ORDER — LIDOCAINE HYDROCHLORIDE 10 MG/ML
10 INJECTION, SOLUTION EPIDURAL; INFILTRATION; INTRACAUDAL; PERINEURAL ONCE
Status: COMPLETED | OUTPATIENT
Start: 2019-04-18 | End: 2019-04-18

## 2019-04-18 RX ORDER — LIDOCAINE HYDROCHLORIDE 10 MG/ML
1 INJECTION, SOLUTION EPIDURAL; INFILTRATION; INTRACAUDAL; PERINEURAL ONCE
Status: DISCONTINUED | OUTPATIENT
Start: 2019-04-18 | End: 2019-04-20 | Stop reason: HOSPADM

## 2019-04-18 RX ADMIN — ONDANSETRON 4 MG: 2 INJECTION INTRAMUSCULAR; INTRAVENOUS at 09:04

## 2019-04-18 RX ADMIN — HYDROMORPHONE HYDROCHLORIDE 0.5 MG: 2 INJECTION, SOLUTION INTRAMUSCULAR; INTRAVENOUS; SUBCUTANEOUS at 10:04

## 2019-04-18 RX ADMIN — DIAZEPAM 5 MG: 5 TABLET ORAL at 01:04

## 2019-04-18 RX ADMIN — ROCURONIUM BROMIDE 5 MG: 10 INJECTION, SOLUTION INTRAVENOUS at 07:04

## 2019-04-18 RX ADMIN — PROPOFOL 180 MG: 10 INJECTION, EMULSION INTRAVENOUS at 07:04

## 2019-04-18 RX ADMIN — SODIUM CHLORIDE 1000 ML: 0.9 INJECTION, SOLUTION INTRAVENOUS at 09:04

## 2019-04-18 RX ADMIN — ROCURONIUM BROMIDE 40 MG: 10 INJECTION, SOLUTION INTRAVENOUS at 07:04

## 2019-04-18 RX ADMIN — LIDOCAINE HYDROCHLORIDE 100 MG: 20 INJECTION, SOLUTION INTRAVENOUS at 07:04

## 2019-04-18 RX ADMIN — FENTANYL CITRATE 50 MCG: 50 INJECTION, SOLUTION INTRAMUSCULAR; INTRAVENOUS at 07:04

## 2019-04-18 RX ADMIN — SUCCINYLCHOLINE CHLORIDE 120 MG: 20 INJECTION, SOLUTION INTRAMUSCULAR; INTRAVENOUS at 07:04

## 2019-04-18 RX ADMIN — SODIUM CHLORIDE: 0.9 INJECTION, SOLUTION INTRAVENOUS at 11:04

## 2019-04-18 RX ADMIN — FENTANYL CITRATE 25 MCG: 50 INJECTION, SOLUTION INTRAMUSCULAR; INTRAVENOUS at 10:04

## 2019-04-18 RX ADMIN — SODIUM CHLORIDE 1000 ML: 0.9 INJECTION, SOLUTION INTRAVENOUS at 05:04

## 2019-04-18 RX ADMIN — MORPHINE SULFATE 4 MG: 4 INJECTION INTRAVENOUS at 11:04

## 2019-04-18 RX ADMIN — DEXAMETHASONE SODIUM PHOSPHATE 8 MG: 4 INJECTION, SOLUTION INTRAMUSCULAR; INTRAVENOUS at 07:04

## 2019-04-18 RX ADMIN — LIDOCAINE HYDROCHLORIDE 100 MG: 10 INJECTION, SOLUTION EPIDURAL; INFILTRATION; INTRACAUDAL at 01:04

## 2019-04-18 RX ADMIN — ACETAMINOPHEN 1000 MG: 10 INJECTION, SOLUTION INTRAVENOUS at 09:04

## 2019-04-18 RX ADMIN — ONDANSETRON 4 MG: 2 INJECTION INTRAMUSCULAR; INTRAVENOUS at 07:04

## 2019-04-18 RX ADMIN — HYDROMORPHONE HYDROCHLORIDE 0.5 MG: 1 INJECTION, SOLUTION INTRAMUSCULAR; INTRAVENOUS; SUBCUTANEOUS at 05:04

## 2019-04-18 RX ADMIN — MORPHINE SULFATE 4 MG: 4 INJECTION INTRAVENOUS at 09:04

## 2019-04-18 RX ADMIN — KETAMINE HYDROCHLORIDE 25 MG: 10 INJECTION, SOLUTION INTRAMUSCULAR; INTRAVENOUS at 07:04

## 2019-04-18 RX ADMIN — CEFAZOLIN 1 G: 330 INJECTION, POWDER, FOR SOLUTION INTRAMUSCULAR; INTRAVENOUS at 06:04

## 2019-04-18 RX ADMIN — HYDROMORPHONE HYDROCHLORIDE 0.5 MG: 1 INJECTION, SOLUTION INTRAMUSCULAR; INTRAVENOUS; SUBCUTANEOUS at 01:04

## 2019-04-18 RX ADMIN — SODIUM CHLORIDE, SODIUM GLUCONATE, SODIUM ACETATE, POTASSIUM CHLORIDE, MAGNESIUM CHLORIDE, SODIUM PHOSPHATE, DIBASIC, AND POTASSIUM PHOSPHATE: .53; .5; .37; .037; .03; .012; .00082 INJECTION, SOLUTION INTRAVENOUS at 07:04

## 2019-04-18 RX ADMIN — CEFAZOLIN 2 G: 330 INJECTION, POWDER, FOR SOLUTION INTRAMUSCULAR; INTRAVENOUS at 07:04

## 2019-04-18 RX ADMIN — MIDAZOLAM HYDROCHLORIDE 2 MG: 1 INJECTION, SOLUTION INTRAMUSCULAR; INTRAVENOUS at 07:04
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
## 2019-04-18 NOTE — ED NOTES
Pt resting on cardiac and O2 monitor. Will continue to monitor pt frequently. Wife at bedside. Family and pt updated on further plan of care/interventions needed. No questions at this time. Call bell in reach. Instructed to call if need of anything.
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
## 2019-04-18 NOTE — SUBJECTIVE & OBJECTIVE
No current facility-administered medications on file prior to encounter.      No current outpatient medications on file prior to encounter.       Review of patient's allergies indicates:  No Known Allergies    No past medical history on file.  No past surgical history on file.  Family History     None        Tobacco Use    Smoking status: Never Smoker    Smokeless tobacco: Never Used   Substance and Sexual Activity    Alcohol use: No    Drug use: No    Sexual activity: Yes     Partners: Female     Review of Systems     Negative except above    Objective:     Vital Signs (Most Recent):  Temp: 98.1 °F (36.7 °C) (04/18/19 0928)  Pulse: 76 (04/18/19 1330)  Resp: 16 (04/18/19 1000)  BP: 134/86 (04/18/19 1330)  SpO2: 95 % (04/18/19 1330) Vital Signs (24h Range):  Temp:  [98.1 °F (36.7 °C)] 98.1 °F (36.7 °C)  Pulse:  [57-78] 76  Resp:  [16-18] 16  SpO2:  [95 %-99 %] 95 %  BP: ()/(62-86) 134/86     Weight: 68 kg (150 lb)  Body mass index is 24.96 kg/m².    Physical Exam   Constitutional: He is oriented to person, place, and time. He appears well-developed and well-nourished.   HENT:   Head: Normocephalic and atraumatic.   Right Ear: External ear normal.   Left Ear: External ear normal.   Mouth/Throat:       Eyes: Pupils are equal, round, and reactive to light. Conjunctivae are normal.   Neck: Normal range of motion. Neck supple.   Cardiovascular: Normal rate and regular rhythm. Exam reveals no friction rub.   No murmur heard.  Pulmonary/Chest: Effort normal and breath sounds normal. No stridor. No respiratory distress. He has no wheezes.   Abdominal: Soft. He exhibits no distension and no mass. There is no tenderness. There is no rebound and no guarding. No hernia.   Musculoskeletal: Normal range of motion. He exhibits deformity.   Left ankle deformity and tenderness   Neurological: He is alert and oriented to person, place, and time.   Skin: Skin is warm and dry. Capillary refill takes less than 2 seconds.            Significant Labs:  CBC:   Recent Labs   Lab 04/18/19  0950   WBC 14.35*   RBC 4.19*   HGB 12.3*   HCT 37.8*      MCV 90   MCH 29.4   MCHC 32.5     CMP:   Recent Labs   Lab 04/18/19  0950   *   CALCIUM 9.4   ALBUMIN 3.5   PROT 7.0      K 3.7   CO2 25      BUN 14   CREATININE 1.0   ALKPHOS 77   ALT 30   AST 31   BILITOT 0.4       Significant Diagnostics:    CT reviewed.
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
## 2019-04-18 NOTE — ED NOTES
Pt aware of NPO status. Pt on O2 and cardiac monitor. Will continue to updated pt on status on care. Pt understanding. Will continue to monitor pt pain and condition frequently.
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
## 2019-04-18 NOTE — ED NOTES
"Patients family member made aware that family member and children will have to go to waiting room during procedure due to risks of xray machine. Patients family member appeared agitated and explained to why by MD. Patients family member states "im about to get out of this room before I have an attitude."  "
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
## 2019-04-19 PROBLEM — S92.002A CLOSED DISPLACED FRACTURE OF LEFT CALCANEUS: Status: ACTIVE | Noted: 2019-04-19

## 2019-04-19 LAB
BILIRUB UR QL STRIP: NEGATIVE
CLARITY UR REFRACT.AUTO: CLEAR
COLOR UR AUTO: YELLOW
GLUCOSE UR QL STRIP: ABNORMAL
HGB UR QL STRIP: NEGATIVE
KETONES UR QL STRIP: NEGATIVE
LEUKOCYTE ESTERASE UR QL STRIP: NEGATIVE
NITRITE UR QL STRIP: NEGATIVE
PH UR STRIP: 7 [PH] (ref 5–8)
PROT UR QL STRIP: NEGATIVE
SP GR UR STRIP: 1.01 (ref 1–1.03)
URN SPEC COLLECT METH UR: ABNORMAL

## 2019-04-19 PROCEDURE — 99204 OFFICE O/P NEW MOD 45 MIN: CPT | Mod: ,,, | Performed by: SURGERY

## 2019-04-19 PROCEDURE — 63600175 PHARM REV CODE 636 W HCPCS: Performed by: STUDENT IN AN ORGANIZED HEALTH CARE EDUCATION/TRAINING PROGRAM

## 2019-04-19 PROCEDURE — 25000003 PHARM REV CODE 250: Performed by: STUDENT IN AN ORGANIZED HEALTH CARE EDUCATION/TRAINING PROGRAM

## 2019-04-19 PROCEDURE — G0378 HOSPITAL OBSERVATION PER HR: HCPCS

## 2019-04-19 PROCEDURE — 99204 PR OFFICE/OUTPT VISIT, NEW, LEVL IV, 45-59 MIN: ICD-10-PCS | Mod: ,,, | Performed by: SURGERY

## 2019-04-19 PROCEDURE — 81003 URINALYSIS AUTO W/O SCOPE: CPT

## 2019-04-19 RX ORDER — ASPIRIN 81 MG
100 TABLET, DELAYED RELEASE (ENTERIC COATED) ORAL 2 TIMES DAILY
Qty: 60 TABLET | Refills: 0 | Status: SHIPPED | OUTPATIENT
Start: 2019-04-19 | End: 2019-08-06

## 2019-04-19 RX ORDER — ONDANSETRON HYDROCHLORIDE 8 MG/1
8 TABLET, FILM COATED ORAL EVERY 8 HOURS PRN
Qty: 60 TABLET | Refills: 0 | Status: SHIPPED | OUTPATIENT
Start: 2019-04-19 | End: 2019-08-06

## 2019-04-19 RX ORDER — OXYCODONE AND ACETAMINOPHEN 10; 325 MG/1; MG/1
1 TABLET ORAL EVERY 4 HOURS PRN
Qty: 40 TABLET | Refills: 0 | Status: ON HOLD | OUTPATIENT
Start: 2019-04-19 | End: 2019-04-25 | Stop reason: SDUPTHER

## 2019-04-19 RX ORDER — ENOXAPARIN SODIUM 100 MG/ML
40 INJECTION SUBCUTANEOUS EVERY 24 HOURS
Status: DISCONTINUED | OUTPATIENT
Start: 2019-04-19 | End: 2019-04-20 | Stop reason: HOSPADM

## 2019-04-19 RX ORDER — MORPHINE SULFATE 2 MG/ML
2 INJECTION, SOLUTION INTRAMUSCULAR; INTRAVENOUS ONCE
Status: COMPLETED | OUTPATIENT
Start: 2019-04-19 | End: 2019-04-19

## 2019-04-19 RX ADMIN — SODIUM CHLORIDE: 0.9 INJECTION, SOLUTION INTRAVENOUS at 02:04

## 2019-04-19 RX ADMIN — OXYCODONE HYDROCHLORIDE 15 MG: 10 TABLET ORAL at 12:04

## 2019-04-19 RX ADMIN — OXYCODONE HYDROCHLORIDE 15 MG: 10 TABLET ORAL at 09:04

## 2019-04-19 RX ADMIN — ENOXAPARIN SODIUM 40 MG: 100 INJECTION SUBCUTANEOUS at 04:04

## 2019-04-19 RX ADMIN — ACETAMINOPHEN 650 MG: 325 TABLET ORAL at 08:04

## 2019-04-19 RX ADMIN — PROMETHAZINE HYDROCHLORIDE 6.25 MG: 25 INJECTION INTRAMUSCULAR; INTRAVENOUS at 02:04

## 2019-04-19 RX ADMIN — OXYCODONE HYDROCHLORIDE 15 MG: 10 TABLET ORAL at 08:04

## 2019-04-19 RX ADMIN — MORPHINE SULFATE 2 MG: 2 INJECTION, SOLUTION INTRAMUSCULAR; INTRAVENOUS at 11:04

## 2019-04-19 RX ADMIN — OXYCODONE HYDROCHLORIDE 15 MG: 10 TABLET ORAL at 05:04

## 2019-04-19 RX ADMIN — OXYCODONE HYDROCHLORIDE 10 MG: 10 TABLET ORAL at 03:04

## 2019-04-19 NOTE — NURSING TRANSFER
Nursing Transfer Note      4/19/2019     Transfer To: [facility] 3090 from PACU    Transfer via stretcher    Transfer with IV pole    Transported by Transport    Medicines sent: NS infusing    Chart send with patient: Yes    Notified: spouse    Patient reassessed at: 4/19/19 01:00

## 2019-04-19 NOTE — PROGRESS NOTES
Surgical Progress Note    Patient seen at the bedside, he was admitted yesterday and underwent surgery with orthopedics.   This morning he is not complaining of any abdominal pain and is tolerating a diet.     Will sign off, thank you for the consult.       Aurelia Ordonez MD  General Surgery PGY V  Beeper: [phone]

## 2019-04-19 NOTE — PROGRESS NOTES
Pt aao x 4, c/o severe pain to L ankle, rates 10/10,  Oxy IR 15mg and Tylenol 650mg given. IS provided with initial instruction, pt demonstrated proper technique and tolerated well. Will reassess Pts VS.

## 2019-04-19 NOTE — PLAN OF CARE
Problem: Adult Inpatient Plan of Care  Goal: Plan of Care Review  Outcome: Ongoing (interventions implemented as appropriate)   Pt AAOx4. Pt reports unresolved break through pain and medication administered per MD orders.  He was encouraged to utilize IS hourly and instructions provided. Appetite is adequate.Fall precautions reviewed with pt and spouse. Pt updated on plan of care. Bed in low position with call light within reach. Pt instructed to call for assistance prior to getting out of bed. Pt placed in non skid slip resistant socks. Fall precautions maintained.
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
## 2019-04-19 NOTE — PROGRESS NOTES
Patient's wife at bedside, Patient opens eyes, denies pain, drifts to sleep. VSS on room air, NAD, pending Xray, LLE elevated and with ice. Wctm

## 2019-04-19 NOTE — TRANSFER OF CARE
"Anesthesia Transfer of Care Note    Patient: Charo Padron    Procedure(s) Performed: Procedure(s) (LRB):  ORIF, FRACTURE, CALCANEUS fracture dislocation (Left)    Patient location: PACU    Anesthesia Type: general    Transport from OR: Transported from OR on 6-10 L/min O2 by face mask with adequate spontaneous ventilation    Post pain: adequate analgesia    Post assessment: no apparent anesthetic complications and tolerated procedure well    Post vital signs: stable    Level of consciousness: lethargic    Nausea/Vomiting: no nausea/vomiting    Complications: none    Transfer of care protocol was followed      Last vitals:   Visit Vitals  /85 (BP Location: Left arm, Patient Position: Lying)   Pulse 91   Temp 36.5 °C (97.7 °F) (Temporal)   Resp 16   Ht 5' 5" (1.651 m)   Wt 68 kg (150 lb)   SpO2 100%   BMI 24.96 kg/m²     "
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
## 2019-04-19 NOTE — SUBJECTIVE & OBJECTIVE
"Principal Problem: Left calcaneous fracture and bimalleolar ankle fracture    Principal Orthopedic Problem: Left calcaneous fracture and bimalleolar ankle fracture    Interval History: Pt seen and examined at bedside. SIRISHAEO. He has pain this morning expected post op and with severe fracture. He has been receiving pain medication. No other complaints this morning.    Review of patient's allergies indicates:  No Known Allergies    Current Facility-Administered Medications   Medication    0.9%  NaCl infusion    acetaminophen tablet 650 mg    diphenhydrAMINE injection 25 mg    HYDROmorphone (DILAUDID) 1 mg/mL injection    lidocaine (PF) 10 mg/ml (1%) injection 10 mg    morphine injection 2 mg    ondansetron disintegrating tablet 8 mg    oxyCODONE immediate release tablet 10 mg    oxyCODONE immediate release tablet 15 mg    oxyCODONE immediate release tablet 5 mg    pneumoc 13-manisha conj-dip cr(PF) (PREVNAR 13 (PF)) 0.5 mL    promethazine (PHENERGAN) 6.25 mg in dextrose 5 % 50 mL IVPB    ramelteon tablet 8 mg    sodium chloride 0.9% flush 10 mL     Objective:     Vital Signs (Most Recent):  Temp: 100.3 °F (37.9 °C) (04/19/19 0357)  Pulse: 92 (04/19/19 0357)  Resp: 18 (04/19/19 0357)  BP: 133/83 (04/19/19 0357)  SpO2: 100 % (04/19/19 0357) Vital Signs (24h Range):  Temp:  [97.7 °F (36.5 °C)-100.3 °F (37.9 °C)] 100.3 °F (37.9 °C)  Pulse:  [] 92  Resp:  [16-27] 18  SpO2:  [89 %-100 %] 100 %  BP: ()/(62-96) 133/83     Weight: 68 kg (149 lb 14.6 oz)  Height: 5' 5" (165.1 cm)  Body mass index is 24.95 kg/m².      Intake/Output Summary (Last 24 hours) at 4/19/2019 0748  Last data filed at 4/19/2019 0700  Gross per 24 hour   Intake 1938.75 ml   Output 1150 ml   Net 788.75 ml       Ortho/SPM Exam    NAD  Normal respiratory effort  LLE  Skin intact  Bulky splint c/d/i  Compartments soft  FROM  SILT over toes exposed distally  Immobilized in splint  Brisk cap refill        Significant Labs:   CBC:   Recent " Labs   Lab 04/18/19  0950   WBC 14.35*   HGB 12.3*   HCT 37.8*        All pertinent labs within the past 24 hours have been reviewed.    Significant Imaging: I have reviewed and interpreted all pertinent imaging results/findings.
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
## 2019-04-19 NOTE — PLAN OF CARE
Problem: Fall Injury Risk  Goal: Absence of Fall and Fall-Related Injury  Outcome: Ongoing (interventions implemented as appropriate)  Patient has been educated on his risk for falls. I have requested that he not get up without assistance and he has compiled with this. Vital signs have been stable all shift, and neurovascular checks have been within normal range. He has and remains to be AAOx4 since he arrived on the unit. At the time of this writing, no fall with injury has occurred. Bed down in lowest position, call light within reach, side rails up x2, bed alarm on. Spouse at bedside.

## 2019-04-20 VITALS
WEIGHT: 149.94 LBS | OXYGEN SATURATION: 95 % | HEIGHT: 65 IN | DIASTOLIC BLOOD PRESSURE: 79 MMHG | SYSTOLIC BLOOD PRESSURE: 119 MMHG | TEMPERATURE: 97 F | RESPIRATION RATE: 16 BRPM | BODY MASS INDEX: 24.98 KG/M2 | HEART RATE: 113 BPM

## 2019-04-20 PROCEDURE — 97166 OT EVAL MOD COMPLEX 45 MIN: CPT

## 2019-04-20 PROCEDURE — G0378 HOSPITAL OBSERVATION PER HR: HCPCS

## 2019-04-20 PROCEDURE — 25000003 PHARM REV CODE 250: Performed by: STUDENT IN AN ORGANIZED HEALTH CARE EDUCATION/TRAINING PROGRAM

## 2019-04-20 PROCEDURE — 97116 GAIT TRAINING THERAPY: CPT

## 2019-04-20 PROCEDURE — 97161 PT EVAL LOW COMPLEX 20 MIN: CPT

## 2019-04-20 PROCEDURE — 97530 THERAPEUTIC ACTIVITIES: CPT

## 2019-04-20 RX ADMIN — OXYCODONE HYDROCHLORIDE 15 MG: 10 TABLET ORAL at 05:04

## 2019-04-20 RX ADMIN — ACETAMINOPHEN 650 MG: 325 TABLET ORAL at 08:04

## 2019-04-20 RX ADMIN — OXYCODONE HYDROCHLORIDE 15 MG: 10 TABLET ORAL at 12:04

## 2019-04-20 RX ADMIN — ACETAMINOPHEN 650 MG: 325 TABLET ORAL at 12:04

## 2019-04-20 RX ADMIN — OXYCODONE HYDROCHLORIDE 10 MG: 10 TABLET ORAL at 08:04

## 2019-04-20 NOTE — NURSING
Advised PT and wife that we are still awaiting prescription from Ortho. PT in no apparent distress at this time. Pt's wife denies that the fever was accurate. Call light in reach.PT currently sitting in wheelchair. Will continue to monitor.

## 2019-04-20 NOTE — PLAN OF CARE
CM updated by ortho team that pt will likely d/c today and needs h/h and DME. CM noted PT eval w/ DME recs for RW, w/c w/ elevated leg rests, BSC and shower chair - pt/family declined shower chair but will accept the remaining DME. CM forwarded DME orders to [facility]DME f [phone] and [facility] b/c pt can't have RW and w/c ordered at the same time. Pt w/ [facility] community plan therefore h/h is not a benefit - CM placed referral for outpt therapy.

## 2019-04-20 NOTE — PT/OT/SLP EVAL
Physical Therapy Evaluation and treatment    Patient Name:  Charo Padron   MRN:  [id number]    Recommendations:     Discharge Recommendations:  home health PT   Discharge Equipment Recommendations: walker, rolling, commode, tub bench, wheelchair, manual with elevating leg rest on LLE   Barriers to discharge: Inaccessible home and Decreased caregiver support    Assessment:     Charo Padron is a 38 y.o. male admitted with a medical diagnosis of Closed displaced fracture of left calcaneus.  He presents with the following impairments/functional limitations:  impaired endurance, impaired functional mobilty, decreased lower extremity function, pain, impaired cardiopulmonary response to activity, orthopedic precautions, weakness.  Pt at overall min A level for transfers and mobility. Pt able to maintain NWB on LLE throughout evaluation and treatment. Pt and wife educated on safety with use of RW, BSC, and stair negotiation.  Educated on importance of mobility, as well as, therex for maintaining strength on LLE during NWB recovery. Pt also educated on energy conservation. Pt and wife verbalized understanding and asked appropriate questions. All concerns addressed. Pt will continue to benefit from skilled services during acute stay to address deficits and increase functional mobility. Pt able to mobilize with nursing staff to and from bed to chair and commode with use of RW and NWB to LLE. Whiteboard updated and RN notified. PT recommends home health PT upon discharge and DME of RW, BSC, TTB, and wheelchair with elevating leg rest on LLE.     Rehab Prognosis: Good; patient would benefit from acute skilled PT services to address these deficits and reach maximum level of function.    Recent Surgery: Procedure(s) (LRB):  ORIF, FRACTURE, CALCANEUS fracture dislocation (Left) 2 Days Post-Op    Plan:     During this hospitalization, patient to be seen 4 x/week to address the identified rehab impairments via gait training,  therapeutic activities, therapeutic exercises, wheelchair management/training and progress toward the following goals:    · Plan of Care Expires:  05/20/19    Subjective     Chief Complaint: pain  Patient/Family Comments/goals: get back to work and home  Pain/Comfort:  · Pain Rating 1: 7/10  · Location - Side 1: Left  · Location - Orientation 1: generalized  · Location 1: leg  · Pain Addressed 1: Pre-medicate for activity, Nurse notified, Reposition, Distraction  · Pain Rating Post-Intervention 1: 7/10    Patients cultural, spiritual, Judaism conflicts given the current situation: no    Living Environment:  Pt reports that he lives with wife in a two story apartment with 20 BASIA and LHR to enter then an additional 18 steps with no hr to get to apartment. Pt reports be able to life with his mother while recovering. She lives in a 1 story home with 4 BASIA and BHR. Pt reports having tub shower combo at both locations. Pt reports working and independent PTA.   Prior to admission, patients level of function was independent.  Equipment used at home: none.  DME owned (not currently used): none.  Upon discharge, patient will have assistance from mother and wife.    Objective:     Communicated with RN prior to session.  Patient found HOB elevated with peripheral IV  upon PT entry to room.    General Precautions: Standard, fall   Orthopedic Precautions:LLE non weight bearing   Braces: N/A     Exams:  · RLE Strength: WNL  · LLE Strength: not formally tested 2* to pain    Functional Mobility:  · Bed Mobility:     · Supine to Sit: minimum assistance for LLE support/management  · Transfers:     · Sit to Stand:  minimum assistance with rolling walker x 1 from edge of bed; contact guard assistance with rolling walker x 2 trials from/to bedside chair and bedside commode  · Gait: Pt ambulated 80 feet with use of RW and CGA. Pt able to maintain NWB on LLE throughout ambulation trial. No LOB noted.   · Stairs:  Pt ascended/descended  4 stair(s) with No Assistive Device with bilateral handrails with Minimal Assistance. Pt able to perform stair negotiation while successfully maintaining NWB on LLE through use of BUEs on BHRs to offset weight to hop up and down stairs.     Therapeutic Activities and Exercises:   Pt and wife educated on safety with use of RW, BSC, and stair negotiation.  Educated on importance of mobility, as well as, therex for maintaining strength on LLE during NWB recovery. Pt also educated on energy conservation. Pt and wife verbalized understanding and asked appropriate questions. All concerns addressed.     AM-PAC 6 CLICK MOBILITY  Total Score:17     Patient left up in chair with all lines intact, call button in reach, RN notified and wife present.    GOALS:   Multidisciplinary Problems     Physical Therapy Goals        Problem: Physical Therapy Goal    Goal Priority Disciplines Outcome Goal Variances Interventions   Physical Therapy Goal     PT, PT/OT Ongoing (interventions implemented as appropriate)     Description:  Goals to be met by: 2019     Patient will increase functional independence with mobility by performin. Supine to sit with Contact Guard Assistance  2. Sit to supine with Contact Guard Assistance  3. Rolling to Left and Right with Contact Guard Assistance.  4. Sit to stand transfer with Stand-by Assistance with use of rolling walker and maintaining NWB on LLE.  5. Bed to chair transfer with Stand-by Assistance using Rolling Walker and maintaining NWB on LLE.  6. Gait  x 100 feet with Stand-by Assistance using Rolling Walker and maintaining NWB on LLE.  7. Ascend/descend 4 stair with bilateral Handrails Contact Guard Assistance and maintaining NWB on LLE.    8. Lower extremity exercise program x 15 reps per handout, with assistance as needed                      History:     Past Medical History:   Diagnosis Date    Seizure        History reviewed. No pertinent surgical history.    Time Tracking:      PT Received On: 04/20/19  PT Start Time: 0720     PT Stop Time: 0815  PT Total Time (min): 55 min     Billable Minutes: Evaluation 1 procedure and Gait Training 20      Patience Koch, PT  04/20/2019

## 2019-04-20 NOTE — PROGRESS NOTES
"[facility]  Orthopedics  Progress Note    Patient Name: Charo Padron  MRN: [id number]  Admission Date: 4/18/2019  Hospital Length of Stay: 0 days  Attending Provider: Ander Mooney MD  Primary Care Provider: Primary Doctor No  Follow-up For: Procedure(s) (LRB):  ORIF, FRACTURE, CALCANEUS fracture dislocation (Left)    Post-Operative Day: 2 Days Post-Op  Subjective:     Principal Problem: Left calcaneous fracture and bimalleolar ankle fracture    Principal Orthopedic Problem: Left calcaneous fracture and bimalleolar ankle fracture    Interval History: Pt seen and examined at bedside. NAEO. He has pain this morning expected post op and with severe fracture. He has been receiving pain medication. No other complaints this morning.     Review of patient's allergies indicates:  No Known Allergies    Current Facility-Administered Medications   Medication    0.9%  NaCl infusion    acetaminophen tablet 650 mg    diphenhydrAMINE injection 25 mg    enoxaparin injection 40 mg    lidocaine (PF) 10 mg/ml (1%) injection 10 mg    morphine injection 2 mg    ondansetron disintegrating tablet 8 mg    oxyCODONE immediate release tablet 10 mg    oxyCODONE immediate release tablet 15 mg    oxyCODONE immediate release tablet 5 mg    promethazine (PHENERGAN) 6.25 mg in dextrose 5 % 50 mL IVPB    ramelteon tablet 8 mg    sodium chloride 0.9% flush 10 mL     Objective:     Vital Signs (Most Recent):  Temp: 100 °F (37.8 °C) (04/20/19 0611)  Pulse: 102 (04/20/19 0611)  Resp: 18 (04/20/19 0611)  BP: (!) 136/92 (04/20/19 0611)  SpO2: 97 % (04/20/19 0611) Vital Signs (24h Range):  Temp:  [98.3 °F (36.8 °C)-101.3 °F (38.5 °C)] 100 °F (37.8 °C)  Pulse:  [] 102  Resp:  [18-19] 18  SpO2:  [97 %-100 %] 97 %  BP: (117-136)/(72-92) 136/92     Weight: 68 kg (149 lb 14.6 oz)  Height: 5' 5" (165.1 cm)  Body mass index is 24.95 kg/m².      Intake/Output Summary (Last 24 hours) at 4/20/2019 0705  Last data filed " at 4/20/2019 0500  Gross per 24 hour   Intake 1817.4 ml   Output 925 ml   Net 892.4 ml       Ortho/SPM Exam      NAD  Normal respiratory effort  LLE  Skin intact  Bulky splint c/d/i  Compartments soft  FROM  SILT over toes exposed distally  Immobilized in splint  Brisk cap refill        Significant Labs:   CBC:   Recent Labs   Lab 04/18/19  0950   WBC 14.35*   HGB 12.3*   HCT 37.8*        All pertinent labs within the past 24 hours have been reviewed.    Significant Imaging: I have reviewed and interpreted all pertinent imaging results/findings.    Assessment/Plan:     * Closed displaced fracture of left calcaneus  Pt is a 39 yo male with L calcaneous fx and bimalleolar ankle fx s/p ORIF of calcaneous on 4/18/19      Pain control: Multimodal  PT/OT: NWB LLE  DVT PPx: lovenox 40mg  Abx: devin op  Labs: pre op  Drain: none  [place]: none    Dispo: Home when pain is controlled. Patient would like one additional day for pain control.             Rush Fischer MD  Orthopedics  [facility]

## 2019-04-20 NOTE — PLAN OF CARE
Problem: Occupational Therapy Goal  Goal: Occupational Therapy Goal  Goals to be met by: 4/30/19    Patient will increase functional independence with ADLs by performing:    UE Dressing with Set-up Assistance and Supervision.  LE Dressing with Contact Guard Assistance.  Grooming while standing at sink with Contact Guard Assistance.  Toileting from toilet with Contact Guard Assistance for hygiene and clothing management.   Supine to sit with Stand-by Assistance.  Toilet transfer to bedside commode with Stand-by Assistance.    Outcome: Ongoing (interventions implemented as appropriate)  Evaluation completed. Initiate POC.   Zena layne OT  4/20/2019

## 2019-04-20 NOTE — NURSING
Pt medicated with oxycodone 15mg po prior to discharge.Dr Hamm notified that she wants crutches prior to d/c.wife upset and left to go to er to demand a set of crutches.Dr Hamm in to speak to family. expressed concern that she wants her  to be dced tonight with him still in pain and feeling so sick.Mother of the pt then confronted the wife because the wife made it sound like we were kicking him out early when in actuality the wife was rushing things once the previous Dr said he could leave tonight if he felt up to it.sl has already been dced.Discussed with  and wife that since she feels that my nursing staff is incompetent , would it be better to transfer pt to [facility].Nursing supervisor, Tanisha,Nursing Supervisor  Notified and arrangements made for pt to be transferred to the orthopedic unit.

## 2019-04-20 NOTE — NURSING
"Family member continues to complain about the delay.wants her  discharged asap.awaiting prescriptions.family member at  telling the next unit that "the nurses on that unit need to go back to school"very rude to my nursing staff.  "

## 2019-04-20 NOTE — DISCHARGE INSTRUCTIONS
Discharge instructions reviewed with patient and family.prescriptions fpr percocet 10/325,colace 100mg, and zofran given to patient

## 2019-04-20 NOTE — PLAN OF CARE
Problem: Physical Therapy Goal  Goal: Physical Therapy Goal  Goals to be met by: 2019     Patient will increase functional independence with mobility by performin. Supine to sit with Contact Guard Assistance  2. Sit to supine with Contact Guard Assistance  3. Rolling to Left and Right with Contact Guard Assistance.  4. Sit to stand transfer with Stand-by Assistance with use of rolling walker and maintaining NWB on LLE.  5. Bed to chair transfer with Stand-by Assistance using Rolling Walker and maintaining NWB on LLE.  6. Gait  x 100 feet with Stand-by Assistance using Rolling Walker and maintaining NWB on LLE.  7. Ascend/descend 4 stair with bilateral Handrails Contact Guard Assistance and maintaining NWB on LLE.    8. Lower extremity exercise program x 15 reps per handout, with assistance as needed    Outcome: Ongoing (interventions implemented as appropriate)  Evaluation and treat completed. Please see note for details.

## 2019-04-20 NOTE — ANESTHESIA POSTPROCEDURE EVALUATION
Anesthesia Post Evaluation    Patient: Charo Padron    Procedure(s) Performed: Procedure(s) (LRB):  ORIF, FRACTURE, CALCANEUS fracture dislocation (Left)    Final Anesthesia Type: general  Patient location during evaluation: PACU  Patient participation: Yes- Able to Participate  Level of consciousness: awake and alert  Post-procedure vital signs: reviewed and stable  Pain management: adequate  Airway patency: patent  PONV status at discharge: No PONV  Anesthetic complications: no      Cardiovascular status: blood pressure returned to baseline  Respiratory status: unassisted  Hydration status: euvolemic  Follow-up not needed.          Vitals Value Taken Time   /80 4/19/2019  7:48 PM   Temp 38.3 °C (101 °F) 4/20/2019 12:50 AM   Pulse 105 4/19/2019  7:48 PM   Resp 19 4/19/2019  7:48 PM   SpO2 98 % 4/19/2019  7:48 PM         Event Time     Out of Recovery 04/19/2019 01:32:00          Pain/Meme Score: Pain Rating Prior to Med Admin: 0 (4/20/2019 12:50 AM)  Meme Score: 9 (4/19/2019 12:31 AM)

## 2019-04-20 NOTE — SUBJECTIVE & OBJECTIVE
"Principal Problem: Left calcaneous fracture and bimalleolar ankle fracture    Principal Orthopedic Problem: Left calcaneous fracture and bimalleolar ankle fracture    Interval History: Pt seen and examined at bedside. SIRISHAEO. He has pain this morning expected post op and with severe fracture. He has been receiving pain medication. No other complaints this morning.     Review of patient's allergies indicates:  No Known Allergies    Current Facility-Administered Medications   Medication    0.9%  NaCl infusion    acetaminophen tablet 650 mg    diphenhydrAMINE injection 25 mg    enoxaparin injection 40 mg    lidocaine (PF) 10 mg/ml (1%) injection 10 mg    morphine injection 2 mg    ondansetron disintegrating tablet 8 mg    oxyCODONE immediate release tablet 10 mg    oxyCODONE immediate release tablet 15 mg    oxyCODONE immediate release tablet 5 mg    promethazine (PHENERGAN) 6.25 mg in dextrose 5 % 50 mL IVPB    ramelteon tablet 8 mg    sodium chloride 0.9% flush 10 mL     Objective:     Vital Signs (Most Recent):  Temp: 100 °F (37.8 °C) (04/20/19 0611)  Pulse: 102 (04/20/19 0611)  Resp: 18 (04/20/19 0611)  BP: (!) 136/92 (04/20/19 0611)  SpO2: 97 % (04/20/19 0611) Vital Signs (24h Range):  Temp:  [98.3 °F (36.8 °C)-101.3 °F (38.5 °C)] 100 °F (37.8 °C)  Pulse:  [] 102  Resp:  [18-19] 18  SpO2:  [97 %-100 %] 97 %  BP: (117-136)/(72-92) 136/92     Weight: 68 kg (149 lb 14.6 oz)  Height: 5' 5" (165.1 cm)  Body mass index is 24.95 kg/m².      Intake/Output Summary (Last 24 hours) at 4/20/2019 0705  Last data filed at 4/20/2019 0500  Gross per 24 hour   Intake 1817.4 ml   Output 925 ml   Net 892.4 ml       Ortho/SPM Exam      NAD  Normal respiratory effort  LLE  Skin intact  Bulky splint c/d/i  Compartments soft  FROM  SILT over toes exposed distally  Immobilized in splint  Brisk cap refill        Significant Labs:   CBC:   Recent Labs   Lab 04/18/19  0950   WBC 14.35*   HGB 12.3*   HCT 37.8*    "     All pertinent labs within the past 24 hours have been reviewed.    Significant Imaging: I have reviewed and interpreted all pertinent imaging results/findings.

## 2019-04-20 NOTE — NURSING
"pts wife at  wanting to know what the hold up is for the discharge.Dr Hamm notified and states he is with a pt and will be in to write the discharge prescriptions as soon as possible.family upset that the discharge is taking so long,she has kids and needs to leave.wants a hospital blanket to go home with him.inquiring about crutches and a scooter.awaiting for Drs visit.wife states that the person taking the vital signs doesn"t know anything and she was wondering if any off of knew anything.i instructed to her that the nursing assistant will not be able to answer her questions but that she had relayed the message to his nurse.  "

## 2019-04-20 NOTE — ASSESSMENT & PLAN NOTE
Pt is a 37 yo male with L calcaneous fx and bimalleolar ankle fx s/p ORIF of calcaneous on 4/18/19      Pain control: Multimodal  PT/OT: NWB LLE  DVT PPx: lovenox 40mg  Abx: devin op  Labs: pre op  Drain: none  [place]: none    Dispo: Home when pain is controlled. Patient would like one additional day for pain control.

## 2019-04-20 NOTE — PT/OT/SLP EVAL
Occupational Therapy   Evaluation/Treatment    Name: Charo Padron  MRN: [id number]  Admitting Diagnosis:  Closed displaced fracture of left calcaneus 2 Days Post-Op    Recommendations:     Discharge Recommendations:  HHOT   Discharge Equipment Recommendations:  wheelchair, manual, tub bench, commode, walker, rolling  Barriers to discharge:  None    Assessment:     Charo Padron is a 38 y.o. male with a medical diagnosis of Closed displaced fracture of left calcaneus.  He presents alert and willing to participate in therapy session. Pt's chief complaint is 7/10 pain in LLE. Performance deficits affecting function: weakness, impaired endurance, impaired self care skills, impaired functional mobilty, gait instability, impaired balance, decreased lower extremity function.  Pt tolerated evaluation well this date however requires increased level of skilled assistance with ADL and functional mobility. He would benefit from HHOT following d/c to continue to progress towards goals and ensure safe transition to home environment.     Rehab Prognosis: Good; patient would benefit from acute skilled OT services to address these deficits and reach maximum level of function.       Plan:     Patient to be seen 4 x/week to address the above listed problems via self-care/home management, therapeutic activities, therapeutic exercises, neuromuscular re-education  · Plan of Care Expires: 05/19/19  · Plan of Care Reviewed with: patient, spouse    Subjective     Chief Complaint: Pain  Patient/Family Comments/goals: Return to PLOF    Occupational Profile:  Living Environment: Pt lives with wife; apartment with ~18 steps to enter and 15 steps inside; however pt will be staying with mothers house with ~3STE and B railing; bathroom contains tub-shower combo with no DME  Previous level of function: PTA, pt reports being independent with ADL and functional mobility   Roles and Routines: 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  Patient found supine with telemetry, peripheral IV upon OT entry to room.    General Precautions: Standard, fall   Orthopedic Precautions:LLE non weight bearing   Braces: N/A     Occupational Performance:    Bed Mobility:    · Patient completed Supine to Sit with minimum assistance  ( LE management)    Functional Mobility/Transfers:  · Patient completed Sit <> Stand Transfer with contact guard assistance and minimum assistance  with  rolling walker   - x1 trial from EOB with min assistance  - x2 trials from bedside chair and beside commode with CGA    · Patient completed Bed <> Chair Transfer using Stand Pivot technique with contact guard assistance with rolling walker  · Patient completed Toilet Transfer Stand Pivot technique with contact guard assistance with  rolling walker  · Functional Mobility: Pt completed functional mobility house hold distance in hallway ( ~80ft) with Rw and CGA for balance and safety. Pt maintaining WB pxs throughout without cues provided.    Activities of Daily Living:  · Upper Body Dressing: minimum assistance to liliana gown like jacket while seated EOB    Cognitive/Visual Perceptual:  Cognitive/Psychosocial Skills:     -       Oriented to: Person, Place, Time and Situation   -       Follows Commands/attention:Follows multistep  commands  -       Communication: clear/fluent  -       Safety awareness/insight to disability: intact   -       Mood/Affect/Coping skills/emotional control:  Appropriate to situation  Visual/Perceptual:      -Intact     Physical Exam:  Postural examination/scapula alignment:    -       Rounded shoulders  Skin integrity: Visible skin intact  Edema:  Mild LLE  Dominant hand:    -       RUE  Upper Extremity Range of Motion:     -       Right Upper Extremity: WFL  -       Left Upper Extremity: WFL  Upper Extremity Strength:    -       Right Upper Extremity: WFL 5/5  -       Left Upper Extremity: WFL  5/5   Strength:    -       Right Upper Extremity: WFL  -       Left Upper Extremity: WFL  Fine Motor Coordination:    -       Intact    AMPAC 6 Click ADL:  AMPAC Total Score: 17    Treatment & Education:  -Pt edu on OT role/POC; discussed DME needs; LLE WB pxs  -Importance of OOB activity with staff assistance  -Safety during functional t/f and mobility ( Rw management/proper hand placement)  - White board updated  - Multiple self care tasks completed-- as noted above  - All questions/concerns answered within OT scope of practice  - Edu pt on LE clothing management technique to improve level of assistance; proper functional transfer to bedside commode    Education:    Patient left up in chair with all lines intact, call button in reach and RN notified    GOALS:   Multidisciplinary Problems     Occupational Therapy Goals        Problem: Occupational Therapy Goal    Goal Priority Disciplines Outcome Interventions   Occupational Therapy Goal     OT, PT/OT Ongoing (interventions implemented as appropriate)    Description:  Goals to be met by: 4/30/19    Patient will increase functional independence with ADLs by performing:    UE Dressing with Set-up Assistance and Supervision.  LE Dressing with Contact Guard Assistance.  Grooming while standing at sink with Contact Guard Assistance.  Toileting from toilet with Contact Guard Assistance for hygiene and clothing management.   Supine to sit with Stand-by Assistance.  Toilet transfer to bedside commode with Stand-by Assistance.                       History:     Past Medical History:   Diagnosis Date    Seizure        History reviewed. No pertinent surgical history.    Time Tracking:     OT Date of Treatment: 04/20/19  OT Start Time: 0724  OT Stop Time: 0816  OT Total Time (min): 52 min    Billable Minutes:Evaluation 40  Therapeutic Activity 12    Zena layne OT  4/20/2019

## 2019-04-20 NOTE — NURSING
Discharge instructions given to pt and family.to return for follow up visit in 1 week.instructed to call for an appt on Monday.prescriptions given for percocet,zofran and colace.sl dced fron rac.instructions reviewed with pt and family reguarding calling the Dr.verbalizes understanding.

## 2019-04-20 NOTE — NURSING
Charge nurse Trista and DRAGAN Santoro are currently transporting pt via bed to room 509 in lieu of transport delays at this time. PT has been in wheel chair most of this evening, however family felt he was to weak to go by wheelchair and sent transport away. Charge Nurse Trista made multiple attempts to contact transport and let them know that our PCT Maude would assist in transfer but was unable to make contact with transport team.

## 2019-04-21 DIAGNOSIS — S82.841A CLOSED BIMALLEOLAR FRACTURE OF RIGHT ANKLE, INITIAL ENCOUNTER: Primary | ICD-10-CM
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
## 2019-04-22 ENCOUNTER — TELEPHONE (OUTPATIENT)
Dept: ORTHOPEDICS | Facility: CLINIC | Age: 39
End: 2019-04-22

## 2019-04-22 NOTE — TELEPHONE ENCOUNTER
Spoke with pt.   Scheduled post op visit with Fatmata Good PA-C for 4/24     2nd sx scheduled with Dr Williamson on 4/25  Pt verbalized understanding

## 2019-04-24 ENCOUNTER — OFFICE VISIT (OUTPATIENT)
Dept: ORTHOPEDICS | Facility: CLINIC | Age: 39
End: 2019-04-24
Payer: MEDICAID

## 2019-04-24 ENCOUNTER — ANESTHESIA EVENT (OUTPATIENT)
Dept: SURGERY | Facility: HOSPITAL | Age: 39
End: 2019-04-24
Payer: MEDICAID

## 2019-04-24 VITALS
HEART RATE: 91 BPM | TEMPERATURE: 100 F | DIASTOLIC BLOOD PRESSURE: 69 MMHG | RESPIRATION RATE: 18 BRPM | SYSTOLIC BLOOD PRESSURE: 113 MMHG

## 2019-04-24 DIAGNOSIS — S82.841A CLOSED BIMALLEOLAR FRACTURE OF RIGHT ANKLE, INITIAL ENCOUNTER: Primary | ICD-10-CM
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
## 2019-04-24 DIAGNOSIS — S92.002D CLOSED DISPLACED FRACTURE OF LEFT CALCANEUS WITH ROUTINE HEALING, UNSPECIFIED PORTION OF CALCANEUS, SUBSEQUENT ENCOUNTER: ICD-10-CM

## 2019-04-24 PROCEDURE — 99213 OFFICE O/P EST LOW 20 MIN: CPT | Mod: PBBFAC | Performed by: PHYSICIAN ASSISTANT

## 2019-04-24 PROCEDURE — 99999 PR PBB SHADOW E&M-EST. PATIENT-LVL III: ICD-10-PCS | Mod: PBBFAC,,, | Performed by: PHYSICIAN ASSISTANT

## 2019-04-24 PROCEDURE — 99499 NO LOS: ICD-10-PCS | Mod: S$PBB,,, | Performed by: PHYSICIAN ASSISTANT

## 2019-04-24 PROCEDURE — 99999 PR PBB SHADOW E&M-EST. PATIENT-LVL III: CPT | Mod: PBBFAC,,, | Performed by: PHYSICIAN ASSISTANT

## 2019-04-24 PROCEDURE — 99499 UNLISTED E&M SERVICE: CPT | Mod: S$PBB,,, | Performed by: PHYSICIAN ASSISTANT
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
## 2019-04-25 ENCOUNTER — ANESTHESIA (OUTPATIENT)
Dept: SURGERY | Facility: HOSPITAL | Age: 39
End: 2019-04-25
Payer: MEDICAID

## 2019-04-25 ENCOUNTER — HOSPITAL ENCOUNTER (OUTPATIENT)
Facility: HOSPITAL | Age: 39
Discharge: HOME | End: 2019-04-25
Attending: ORTHOPAEDIC SURGERY | Admitting: ORTHOPAEDIC SURGERY
Payer: MEDICAID

## 2019-04-25 VITALS
OXYGEN SATURATION: 97 % | HEART RATE: 105 BPM | HEIGHT: 65 IN | DIASTOLIC BLOOD PRESSURE: 80 MMHG | RESPIRATION RATE: 17 BRPM | WEIGHT: 145 LBS | BODY MASS INDEX: 24.16 KG/M2 | SYSTOLIC BLOOD PRESSURE: 117 MMHG | TEMPERATURE: 98 F

## 2019-04-25 DIAGNOSIS — S92.002D CLOSED DISPLACED FRACTURE OF LEFT CALCANEUS WITH ROUTINE HEALING, UNSPECIFIED PORTION OF CALCANEUS, SUBSEQUENT ENCOUNTER: Primary | ICD-10-CM

## 2019-04-25 DIAGNOSIS — S82.842A BIMALLEOLAR ANKLE FRACTURE, LEFT, CLOSED, INITIAL ENCOUNTER: ICD-10-CM

## 2019-04-25 PROCEDURE — 64445 NJX AA&/STRD SCIATIC NRV IMG: CPT | Mod: 59 | Performed by: STUDENT IN AN ORGANIZED HEALTH CARE EDUCATION/TRAINING PROGRAM

## 2019-04-25 PROCEDURE — 27000221 HC OXYGEN, UP TO 24 HOURS

## 2019-04-25 PROCEDURE — 94761 N-INVAS EAR/PLS OXIMETRY MLT: CPT

## 2019-04-25 PROCEDURE — 63600175 PHARM REV CODE 636 W HCPCS: Performed by: NURSE ANESTHETIST, CERTIFIED REGISTERED

## 2019-04-25 PROCEDURE — 63600175 PHARM REV CODE 636 W HCPCS: Performed by: STUDENT IN AN ORGANIZED HEALTH CARE EDUCATION/TRAINING PROGRAM

## 2019-04-25 PROCEDURE — D9220A PRA ANESTHESIA: ICD-10-PCS | Mod: CRNA,,, | Performed by: NURSE ANESTHETIST, CERTIFIED REGISTERED

## 2019-04-25 PROCEDURE — 37000009 HC ANESTHESIA EA ADD 15 MINS: Performed by: ORTHOPAEDIC SURGERY

## 2019-04-25 PROCEDURE — 27829 PR OPEN TX DISTAL TIBIOFIBULAR JOINT DISRUPTION: ICD-10-PCS | Mod: 58,51,LT, | Performed by: ORTHOPAEDIC SURGERY

## 2019-04-25 PROCEDURE — 64445 PR NERVE BLOCK INJ, ANES/STEROID, SCIATIC, INCL IMAG GUIDANCE: ICD-10-PCS | Mod: 59,LT,, | Performed by: ANESTHESIOLOGY

## 2019-04-25 PROCEDURE — 25000003 PHARM REV CODE 250: Performed by: ORTHOPAEDIC SURGERY

## 2019-04-25 PROCEDURE — 01480 ANES OPEN PX LOWER L/A/F NOS: CPT | Performed by: ORTHOPAEDIC SURGERY

## 2019-04-25 PROCEDURE — 64447 NJX AA&/STRD FEMORAL NRV IMG: CPT | Performed by: ANESTHESIOLOGY

## 2019-04-25 PROCEDURE — 27201423 OPTIME MED/SURG SUP & DEVICES STERILE SUPPLY: Performed by: ORTHOPAEDIC SURGERY

## 2019-04-25 PROCEDURE — 71000033 HC RECOVERY, INTIAL HOUR: Performed by: ORTHOPAEDIC SURGERY

## 2019-04-25 PROCEDURE — 71000039 HC RECOVERY, EACH ADD'L HOUR: Performed by: ORTHOPAEDIC SURGERY

## 2019-04-25 PROCEDURE — 25000003 PHARM REV CODE 250: Performed by: NURSE ANESTHETIST, CERTIFIED REGISTERED

## 2019-04-25 PROCEDURE — 27829 TREAT LOWER LEG JOINT: CPT | Mod: 58,51,LT, | Performed by: ORTHOPAEDIC SURGERY

## 2019-04-25 PROCEDURE — C1713 ANCHOR/SCREW BN/BN,TIS/BN: HCPCS | Performed by: ORTHOPAEDIC SURGERY

## 2019-04-25 PROCEDURE — 76942 ECHO GUIDE FOR BIOPSY: CPT | Mod: 26,,, | Performed by: ANESTHESIOLOGY

## 2019-04-25 PROCEDURE — 36000708 HC OR TIME LEV III 1ST 15 MIN: Performed by: ORTHOPAEDIC SURGERY

## 2019-04-25 PROCEDURE — 76942 ECHO GUIDE FOR BIOPSY: CPT | Performed by: STUDENT IN AN ORGANIZED HEALTH CARE EDUCATION/TRAINING PROGRAM

## 2019-04-25 PROCEDURE — 37000008 HC ANESTHESIA 1ST 15 MINUTES: Performed by: ORTHOPAEDIC SURGERY

## 2019-04-25 PROCEDURE — 27822 TREATMENT OF ANKLE FRACTURE: CPT | Mod: 58,LT,, | Performed by: ORTHOPAEDIC SURGERY

## 2019-04-25 PROCEDURE — D9220A PRA ANESTHESIA: Mod: ANES,,, | Performed by: ANESTHESIOLOGY

## 2019-04-25 PROCEDURE — 76942 ADDUCTOR CANAL SINGLE INJECTION BLOCK: ICD-10-PCS | Mod: 26,,, | Performed by: ANESTHESIOLOGY

## 2019-04-25 PROCEDURE — 25000003 PHARM REV CODE 250: Performed by: STUDENT IN AN ORGANIZED HEALTH CARE EDUCATION/TRAINING PROGRAM

## 2019-04-25 PROCEDURE — 36000709 HC OR TIME LEV III EA ADD 15 MIN: Performed by: ORTHOPAEDIC SURGERY

## 2019-04-25 PROCEDURE — 71000015 HC POSTOP RECOV 1ST HR: Performed by: ORTHOPAEDIC SURGERY

## 2019-04-25 PROCEDURE — 64447 ADDUCTOR CANAL SINGLE INJECTION BLOCK: ICD-10-PCS | Mod: 59,LT,, | Performed by: ANESTHESIOLOGY

## 2019-04-25 PROCEDURE — D9220A PRA ANESTHESIA: Mod: CRNA,,, | Performed by: NURSE ANESTHETIST, CERTIFIED REGISTERED

## 2019-04-25 PROCEDURE — 64445 NJX AA&/STRD SCIATIC NRV IMG: CPT | Mod: 59,LT,, | Performed by: ANESTHESIOLOGY

## 2019-04-25 PROCEDURE — D9220A PRA ANESTHESIA: ICD-10-PCS | Mod: ANES,,, | Performed by: ANESTHESIOLOGY

## 2019-04-25 PROCEDURE — 27822 PR OPEN TX TRIMALLEOLAR ANKLE FX W/O FIX PST LIP: ICD-10-PCS | Mod: 58,LT,, | Performed by: ORTHOPAEDIC SURGERY

## 2019-04-25 DEVICE — SCREW CRTX LOW PROFILE H 50MM: Type: IMPLANTABLE DEVICE | Site: FIBULA | Status: FUNCTIONAL

## 2019-04-25 DEVICE — SCREW 2.7X16MM: Type: IMPLANTABLE DEVICE | Site: FIBULA | Status: FUNCTIONAL

## 2019-04-25 DEVICE — SCREW CRTX LOW PROFILE H 75MM: Type: IMPLANTABLE DEVICE | Site: TIBIA | Status: FUNCTIONAL

## 2019-04-25 DEVICE — SCREW STRDRV REC T8 2.7X10 SS: Type: IMPLANTABLE DEVICE | Site: FIBULA | Status: FUNCTIONAL

## 2019-04-25 DEVICE — SCREW CORTEX 3.5MM X 14MM.: Type: IMPLANTABLE DEVICE | Site: FIBULA | Status: FUNCTIONAL

## 2019-04-25 DEVICE — SCREW CORTEX 3.5MM X 16MM: Type: IMPLANTABLE DEVICE | Site: FIBULA | Status: FUNCTIONAL

## 2019-04-25 DEVICE — SCREW 2.7X18MM: Type: IMPLANTABLE DEVICE | Site: FIBULA | Status: FUNCTIONAL

## 2019-04-25 DEVICE — SCREW STRDRV REC T8 2.7X12 SS: Type: IMPLANTABLE DEVICE | Site: FIBULA | Status: FUNCTIONAL

## 2019-04-25 DEVICE — PLATE BONE 6H 3.5X2.7X112MM LF: Type: IMPLANTABLE DEVICE | Site: FIBULA | Status: FUNCTIONAL

## 2019-04-25 DEVICE — SCREW CORTEX 2.7 X 22: Type: IMPLANTABLE DEVICE | Site: FIBULA | Status: FUNCTIONAL

## 2019-04-25 DEVICE — SCREW CRTX LOW PROFILE H 42MM: Type: IMPLANTABLE DEVICE | Site: FIBULA | Status: FUNCTIONAL

## 2019-04-25 RX ORDER — LIDOCAINE HCL/PF 100 MG/5ML
SYRINGE (ML) INTRAVENOUS
Status: DISCONTINUED | OUTPATIENT
Start: 2019-04-25 | End: 2019-04-25

## 2019-04-25 RX ORDER — HYDROCODONE BITARTRATE AND ACETAMINOPHEN 5; 325 MG/1; MG/1
1 TABLET ORAL EVERY 4 HOURS PRN
Status: DISCONTINUED | OUTPATIENT
Start: 2019-04-25 | End: 2019-04-25 | Stop reason: HOSPADM

## 2019-04-25 RX ORDER — FENTANYL CITRATE 50 UG/ML
25 INJECTION, SOLUTION INTRAMUSCULAR; INTRAVENOUS EVERY 5 MIN PRN
Status: DISCONTINUED | OUTPATIENT
Start: 2019-04-25 | End: 2019-04-25 | Stop reason: HOSPADM

## 2019-04-25 RX ORDER — ROCURONIUM BROMIDE 10 MG/ML
INJECTION, SOLUTION INTRAVENOUS
Status: DISCONTINUED | OUTPATIENT
Start: 2019-04-25 | End: 2019-04-25

## 2019-04-25 RX ORDER — FENTANYL CITRATE 50 UG/ML
INJECTION, SOLUTION INTRAMUSCULAR; INTRAVENOUS
Status: DISCONTINUED | OUTPATIENT
Start: 2019-04-25 | End: 2019-04-25

## 2019-04-25 RX ORDER — GLYCOPYRROLATE 0.2 MG/ML
INJECTION INTRAMUSCULAR; INTRAVENOUS
Status: DISCONTINUED | OUTPATIENT
Start: 2019-04-25 | End: 2019-04-25

## 2019-04-25 RX ORDER — KETAMINE HCL IN 0.9 % NACL 50 MG/5 ML
SYRINGE (ML) INTRAVENOUS
Status: DISCONTINUED | OUTPATIENT
Start: 2019-04-25 | End: 2019-04-25

## 2019-04-25 RX ORDER — CEFAZOLIN SODIUM 1 G/3ML
2 INJECTION, POWDER, FOR SOLUTION INTRAMUSCULAR; INTRAVENOUS
Status: COMPLETED | OUTPATIENT
Start: 2019-04-25 | End: 2019-04-25

## 2019-04-25 RX ORDER — PROPOFOL 10 MG/ML
VIAL (ML) INTRAVENOUS
Status: DISCONTINUED | OUTPATIENT
Start: 2019-04-25 | End: 2019-04-25

## 2019-04-25 RX ORDER — ONDANSETRON 2 MG/ML
INJECTION INTRAMUSCULAR; INTRAVENOUS
Status: DISCONTINUED | OUTPATIENT
Start: 2019-04-25 | End: 2019-04-25

## 2019-04-25 RX ORDER — NEOSTIGMINE METHYLSULFATE 1 MG/ML
INJECTION, SOLUTION INTRAVENOUS
Status: DISCONTINUED | OUTPATIENT
Start: 2019-04-25 | End: 2019-04-25

## 2019-04-25 RX ORDER — OXYCODONE AND ACETAMINOPHEN 10; 325 MG/1; MG/1
1 TABLET ORAL EVERY 4 HOURS PRN
Qty: 50 TABLET | Refills: 0 | Status: SHIPPED | OUTPATIENT
Start: 2019-04-25 | End: 2019-06-25

## 2019-04-25 RX ORDER — HYDROCODONE BITARTRATE AND ACETAMINOPHEN 10; 325 MG/1; MG/1
1 TABLET ORAL EVERY 4 HOURS PRN
Status: DISCONTINUED | OUTPATIENT
Start: 2019-04-25 | End: 2019-04-25 | Stop reason: HOSPADM

## 2019-04-25 RX ORDER — ROPIVACAINE HYDROCHLORIDE 5 MG/ML
INJECTION, SOLUTION EPIDURAL; INFILTRATION; PERINEURAL
Status: COMPLETED | OUTPATIENT
Start: 2019-04-25 | End: 2019-04-25

## 2019-04-25 RX ORDER — SODIUM CHLORIDE 9 MG/ML
INJECTION, SOLUTION INTRAVENOUS CONTINUOUS
Status: ACTIVE | OUTPATIENT
Start: 2019-04-25

## 2019-04-25 RX ORDER — MUPIROCIN 20 MG/G
OINTMENT TOPICAL
Status: DISPENSED | OUTPATIENT
Start: 2019-04-25

## 2019-04-25 RX ORDER — ONDANSETRON 2 MG/ML
4 INJECTION INTRAMUSCULAR; INTRAVENOUS EVERY 12 HOURS PRN
Status: DISCONTINUED | OUTPATIENT
Start: 2019-04-25 | End: 2019-04-25 | Stop reason: HOSPADM

## 2019-04-25 RX ORDER — ACETAMINOPHEN 10 MG/ML
INJECTION, SOLUTION INTRAVENOUS
Status: DISCONTINUED | OUTPATIENT
Start: 2019-04-25 | End: 2019-04-25

## 2019-04-25 RX ORDER — BACITRACIN ZINC 500 UNIT/G
OINTMENT (GRAM) TOPICAL
Status: DISCONTINUED | OUTPATIENT
Start: 2019-04-25 | End: 2019-04-25 | Stop reason: HOSPADM

## 2019-04-25 RX ORDER — SODIUM CHLORIDE 0.9 % (FLUSH) 0.9 %
3 SYRINGE (ML) INJECTION
Status: DISCONTINUED | OUTPATIENT
Start: 2019-04-25 | End: 2019-04-25 | Stop reason: HOSPADM

## 2019-04-25 RX ORDER — SODIUM CHLORIDE 0.9 % (FLUSH) 0.9 %
5 SYRINGE (ML) INJECTION
Status: DISCONTINUED | OUTPATIENT
Start: 2019-04-25 | End: 2019-04-25 | Stop reason: HOSPADM

## 2019-04-25 RX ORDER — MIDAZOLAM HYDROCHLORIDE 1 MG/ML
0.5 INJECTION INTRAMUSCULAR; INTRAVENOUS
Status: DISCONTINUED | OUTPATIENT
Start: 2019-04-25 | End: 2019-04-25 | Stop reason: HOSPADM

## 2019-04-25 RX ORDER — DEXAMETHASONE SODIUM PHOSPHATE 4 MG/ML
INJECTION, SOLUTION INTRA-ARTICULAR; INTRALESIONAL; INTRAMUSCULAR; INTRAVENOUS; SOFT TISSUE
Status: DISCONTINUED | OUTPATIENT
Start: 2019-04-25 | End: 2019-04-25

## 2019-04-25 RX ORDER — MIDAZOLAM HYDROCHLORIDE 1 MG/ML
INJECTION, SOLUTION INTRAMUSCULAR; INTRAVENOUS
Status: DISCONTINUED | OUTPATIENT
Start: 2019-04-25 | End: 2019-04-25

## 2019-04-25 RX ORDER — EPHEDRINE SULFATE 50 MG/ML
INJECTION, SOLUTION INTRAVENOUS
Status: DISCONTINUED | OUTPATIENT
Start: 2019-04-25 | End: 2019-04-25

## 2019-04-25 RX ADMIN — ROPIVACAINE HYDROCHLORIDE 30 ML: 5 INJECTION, SOLUTION EPIDURAL; INFILTRATION; PERINEURAL at 06:04

## 2019-04-25 RX ADMIN — GLYCOPYRROLATE 0.3 MG: 0.2 INJECTION, SOLUTION INTRAMUSCULAR; INTRAVENOUS at 08:04

## 2019-04-25 RX ADMIN — Medication 10 MG: at 08:04

## 2019-04-25 RX ADMIN — LIDOCAINE HYDROCHLORIDE 80 MG: 20 INJECTION, SOLUTION INTRAVENOUS at 07:04

## 2019-04-25 RX ADMIN — EPHEDRINE SULFATE 10 MG: 50 INJECTION, SOLUTION INTRAMUSCULAR; INTRAVENOUS; SUBCUTANEOUS at 08:04

## 2019-04-25 RX ADMIN — NEOSTIGMINE METHYLSULFATE 3 MG: 1 INJECTION INTRAVENOUS at 08:04

## 2019-04-25 RX ADMIN — PROPOFOL 150 MG: 10 INJECTION, EMULSION INTRAVENOUS at 07:04

## 2019-04-25 RX ADMIN — SODIUM CHLORIDE: 0.9 INJECTION, SOLUTION INTRAVENOUS at 06:04

## 2019-04-25 RX ADMIN — Medication 15 MG: at 07:04

## 2019-04-25 RX ADMIN — MIDAZOLAM HYDROCHLORIDE 2 MG: 1 INJECTION, SOLUTION INTRAMUSCULAR; INTRAVENOUS at 06:04

## 2019-04-25 RX ADMIN — ACETAMINOPHEN 1000 MG: 10 INJECTION, SOLUTION INTRAVENOUS at 07:04

## 2019-04-25 RX ADMIN — ROCURONIUM BROMIDE 40 MG: 10 INJECTION, SOLUTION INTRAVENOUS at 07:04

## 2019-04-25 RX ADMIN — CEFAZOLIN 2 G: 330 INJECTION, POWDER, FOR SOLUTION INTRAMUSCULAR; INTRAVENOUS at 07:04

## 2019-04-25 RX ADMIN — DEXAMETHASONE SODIUM PHOSPHATE 4 MG: 4 INJECTION, SOLUTION INTRAMUSCULAR; INTRAVENOUS at 07:04

## 2019-04-25 RX ADMIN — SODIUM CHLORIDE, SODIUM GLUCONATE, SODIUM ACETATE, POTASSIUM CHLORIDE, MAGNESIUM CHLORIDE, SODIUM PHOSPHATE, DIBASIC, AND POTASSIUM PHOSPHATE: .53; .5; .37; .037; .03; .012; .00082 INJECTION, SOLUTION INTRAVENOUS at 07:04

## 2019-04-25 RX ADMIN — ROPIVACAINE HYDROCHLORIDE 15 ML: 5 INJECTION, SOLUTION EPIDURAL; INFILTRATION; PERINEURAL at 06:04

## 2019-04-25 RX ADMIN — FENTANYL CITRATE 50 MCG: 50 INJECTION INTRAMUSCULAR; INTRAVENOUS at 06:04

## 2019-04-25 RX ADMIN — ONDANSETRON 4 MG: 2 INJECTION INTRAMUSCULAR; INTRAVENOUS at 07:04

## 2019-04-25 RX ADMIN — HYDROCODONE BITARTRATE AND ACETAMINOPHEN 1 TABLET: 10; 325 TABLET ORAL at 10:04

## 2019-04-25 RX ADMIN — FENTANYL CITRATE 50 MCG: 50 INJECTION, SOLUTION INTRAMUSCULAR; INTRAVENOUS at 07:04

## 2019-04-25 NOTE — ANESTHESIA POSTPROCEDURE EVALUATION
Anesthesia Post Evaluation    Patient: Charo Padron    Procedure(s) Performed: Procedure(s) (LRB):  ORIF, ANKLE LEFT (Left)    Final Anesthesia Type: general  Patient location during evaluation: PACU  Patient participation: Yes- Able to Participate  Level of consciousness: awake and alert  Post-procedure vital signs: reviewed and stable  Pain management: adequate  Airway patency: patent  PONV status at discharge: No PONV  Anesthetic complications: no      Cardiovascular status: blood pressure returned to baseline  Respiratory status: unassisted  Hydration status: euvolemic  Follow-up not needed.          Vitals Value Taken Time   /80 4/25/2019 11:46 AM   Temp 37.1 °C (98.8 °F) 4/25/2019 10:50 AM   Pulse 90 4/25/2019 11:47 AM   Resp 17 4/25/2019 11:00 AM   SpO2 97 % 4/25/2019 11:47 AM   Vitals shown include unvalidated device data.      Event Time     Out of Recovery 10:45:36          Pain/Meme Score: Pain Rating Prior to Med Admin: 7 (4/25/2019 10:21 AM)  Meme Score: 10 (4/25/2019 10:50 AM)

## 2019-04-25 NOTE — DISCHARGE INSTRUCTIONS
Having Ankle Fracture Open Reduction and Internal Fixation (ORIF)  Open reduction and internal fixation (ORIF) is a type of treatment to fix a broken bone. It puts the pieces of a broken bone back together so they can heal. Open reduction means the bones are put back in place during a surgery. Internal fixation means that special hardware is used to hold the bone pieces together. This helps the bone heals correctly. The procedure is done by an orthopedic surgeon. This is a doctor with special training in treating bone, joint, and muscle problems.  What to tell your healthcare provider  Make sure you tell the healthcare provider about all medicines you take, including over-the-counter medicines, such as aspirin. Tell him or her about all vitamins, herbs, and other supplements you take. Also tell the provider the last time you had something to eat or drink. And tell your provider if you:  · Have had any recent changes in your health, such as an infection or fever  · Are sensitive or allergic to any medicines, latex, tape, or anesthetic medicines (local and general)  · Are pregnant or think you may be pregnant    After your surgery  Talk with your surgeon about what you can expect after your surgery. You may go home the same day. Or you may stay overnight in the hospital. Before leaving the hospital, you will likely have X-rays taken of your ankle. This is to check the repair.  You will have some pain after the surgery. Your doctor will tell you what pain medicine you can take to help reduce the pain. Avoid certain over-the-counter medicines for pain as instructed. Some of these may interfere with bone healing. You can also use ice packs to help lessen pain and swelling.  You may be told to keep your ankle elevated for a period of time after your surgery. Youll also need to not move your ankle for a while. Often, this means wearing a brace, perhaps for several weeks. Youjennifer get instructions about how to move  your leg and when you can put weight on it. Your surgeon may also tell you to eat foods high in calcium and vitamin D to help with bone healing. You may need to take medicine to prevent blood clots (blood thinner) for a little while after your surgery. Follow all your doctors instructions carefully.  Follow-up care  Make sure to keep all of your follow-up appointments. You may need to have your stitches removed a week or so after your surgery.  You may have physical therapy to improve the strength and movement of your ankle. The therapy may include treatments and exercises. The therapy improves your chances of a full recovery. Most people are able to return to all their normal activities within a few months.     When to call your healthcare provider  Call your healthcare provider right away if you have any of these:  · Fever of 100.4°F (38°C) or higher  · Redness, swelling, or fluid leaking from your incision that gets worse  · Pain that gets worse  · Loss of feeling in your foot or leg   Date Last Reviewed: 8/6/2015  © 2918-1200 [employer]. [address] [place] [address]. All rights reserved. This information is not intended as a substitute for professional medical care. Always follow your healthcare professional's instructions.        Anesthesia: General Anesthesia       You are watched continuously during your procedure by your anesthesia provider.     Youre due to have surgery. During surgery, youll be given medicine called anesthesia or anesthetic. This will keep you comfortable and pain-free. Your anesthesia provider will use general anesthesia.  What is general anesthesia?  General anesthesia puts you into a state like deep sleep. It goes into the bloodstream (IV anesthetics), into the lungs (gas anesthetics), or both. You feel nothing during the procedure. You will not remember it. During the procedure, the anesthesia provider monitors you continuously. He or she checks your  heart rate and rhythm, blood pressure, breathing, and blood oxygen.  · IV anesthetics. IV anesthetics are given through an IV line in your arm. Theyre often given first. This is so you are asleep before a gas anesthetic is started. Some kinds of IV anesthetics relieve pain. Others relax you. Your doctor will decide which kind is best in your case.  · Gas anesthetics. Gas anesthetics are breathed into the lungs. They are often used to keep you asleep. They can be given through a facemask or a tube placed in your larynx or trachea (breathing tube).  ¨ If you have a facemask, your anesthesia provider will most likely place it over your nose and mouth while youre still awake. Youll breathe oxygen through the mask as your IV anesthetic is started. Gas anesthetic may be added through the mask.  ¨ If you have a tube in the larynx or trachea, it will be inserted into your throat after youre asleep.  Anesthesia tools and medicines  You will likely have:  · IV anesthetics. These are put into an IV line into your bloodstream.  · Gas anesthetics. You breathe these anesthetics into your lungs, where they pass into your bloodstream.  · Pulse oximeter. This is a small clip that is attached to the end of your finger. This measures your blood oxygen level.  · Electrocardiography leads (electrodes). These are small sticky pads that are placed on your chest. They record your heart rate and rhythm.  · Blood pressure cuff. This reads your blood pressure.  Risks and possible complications  General anesthesia has some risks. These include:  · Breathing problems  · Nausea and vomiting  · Sore throat or hoarseness (usually temporary)  · Allergic reaction to the anesthetic  · Irregular heartbeat (rare)  · Cardiac arrest (rare)   Anesthesia safety  · Follow all instructions you are given for how long not to eat or drink before your procedure.  · Be sure your doctor knows what medicines and drugs you take. This includes over-the-counter  medicines, herbs, supplements, alcohol or other drugs. You will be asked when those were last taken.  · Have an adult family member or friend drive you home after the procedure.  · For the first 24 hours after your surgery:  ¨ Do not drive or use heavy equipment.  ¨ Do not make important decisions or sign legal documents. If important decisions or signing legal documents is necessary during the first 24 hours after surgery, have a trusted family member or spouse act on your behalf.  ¨ Avoid alcohol.  ¨ Have a responsible adult stay with you. He or she can watch for problems and help keep you safe.  Date Last Reviewed: 12/1/2016  © 9967-7116 [employer]. [address] [place] [address], [place] [address]. All rights reserved. This information is not intended as a substitute for professional medical care. Always follow your healthcare professional's instructions.

## 2019-04-25 NOTE — PLAN OF CARE
VSS. Patient denies pain. No N&V. SCD's in place throughout duration in PACU. Transferred to the next Phase of Care.

## 2019-04-25 NOTE — DISCHARGE SUMMARY
[facility]  Discharge Note  Short Stay    Admit Date: 4/25/2019    Discharge Date and Time: No discharge date for patient encounter.     Attending Physician: Goldy Williamson MD     Discharge Provider: Kecia Aleman MD    Diagnoses:  Active Hospital Problems    Diagnosis  POA    *Bimalleolar ankle fracture, left, closed, initial encounter [Z69.844T]  Yes      Resolved Hospital Problems   No resolved problems to display.       Discharged Condition: good    Hospital Course: Patient was admitted for an outpatient procedure and tolerated the procedure well with no complications.    Final Diagnoses: Same as principal problem.    Disposition: Home or Self Care    Follow up/Patient Instructions:    Medications:  Reconciled Home Medications:      Medication List      CONTINUE taking these medications    docusate sodium 100 mg capsule  Take 100 mg by mouth 2 (two) times daily.     ondansetron 8 MG tablet  Commonly known as:  ZOFRAN  Take 1 tablet (8 mg total) by mouth every 8 (eight) hours as needed for Nausea.     oxyCODONE-acetaminophen  mg per tablet  Commonly known as:  PERCOCET  Take 1 tablet by mouth every 4 (four) hours as needed for Pain.          Discharge Procedure Orders   Diet general     Call MD for:  temperature >100.4     Call MD for:  persistent nausea and vomiting     Call MD for:  severe uncontrolled pain     Call MD for:  difficulty breathing, headache or visual disturbances     Call MD for:  redness, tenderness, or signs of infection (pain, swelling, redness, odor or green/yellow discharge around incision site)     Call MD for:  hives     Call MD for:  persistent dizziness or light-headedness     Call MD for:  extreme fatigue     Sponge bath only until clinic visit     Leave dressing on - Keep it clean, dry, and intact until clinic visit     Non weight bearing     Follow-up Information     Fatmata Good PA-C In 2 weeks.    Specialty:  Orthopedic Surgery  Why:  For suture  removal  Contact information:  [address]  [facility] [address]  [phone]

## 2019-04-25 NOTE — PLAN OF CARE
Patient denies pain and nausea.  Tolerates clear liquids well.  VSS.  Discharge instructions given with verbal understanding received.  Anesthesia and procedure consents in chart. Ice pack and pain prescription sent home with patient.

## 2019-04-25 NOTE — ANESTHESIA PROCEDURE NOTES
Popliteal Sciatic Single Injection Block    Patient location during procedure: pre-op   Block not for primary anesthetic.  Reason for block: at surgeon's request and post-op pain management   Post-op Pain Location: Left leg pain  Start time: 4/25/2019 6:40 AM  Timeout: 4/25/2019 6:38 AM   End time: 4/25/2019 6:46 AM  Staffing  Anesthesiologist: Cassie Mcconnell MD  Resident/CRNA: Ki Alfaro MD  Performed: resident/CRNA   Preanesthetic Checklist  Completed: patient identified, site marked, surgical consent, pre-op evaluation, timeout performed, IV checked, risks and benefits discussed and monitors and equipment checked  Peripheral Block  Patient position: supine  Prep: ChloraPrep  Patient monitoring: heart rate, cardiac monitor, continuous pulse ox, continuous capnometry and frequent blood pressure checks  Block type: popliteal  Laterality: left  Injection technique: single shot  Needle  Needle type: Stimuplex   Needle gauge: 21 G  Needle length: 4 in  Needle localization: anatomical landmarks and ultrasound guidance   -ultrasound image captured on disc.  Assessment  Injection assessment: negative aspiration, negative parasthesia and local visualized surrounding nerve  Paresthesia pain: none  Heart rate change: no  Slow fractionated injection: yes  Additional Notes  VSS.  [facility] RN monitoring vitals throughout procedure.  Patient tolerated procedure well.

## 2019-04-25 NOTE — ANESTHESIA PROCEDURE NOTES
Adductor Canal Single Injection Block    Patient location during procedure: pre-op   Block not for primary anesthetic.  Reason for block: at surgeon's request and post-op pain management   Post-op Pain Location: Left ankle pain  Start time: 4/25/2019 6:40 AM  Timeout: 4/25/2019 6:38 AM   End time: 4/25/2019 6:46 AM  Staffing  Anesthesiologist: Cassie Mcconnell MD  Resident/CRNA: Ki Alfaro MD  Performed: resident/CRNA   Preanesthetic Checklist  Completed: patient identified, site marked, surgical consent, pre-op evaluation, timeout performed, IV checked, risks and benefits discussed and monitors and equipment checked  Peripheral Block  Patient position: supine  Prep: ChloraPrep  Patient monitoring: heart rate, cardiac monitor, continuous pulse ox, continuous capnometry and frequent blood pressure checks  Block type: adductor canal  Laterality: left  Injection technique: single shot  Needle  Needle type: Stimuplex   Needle gauge: 21 G  Needle length: 4 in  Needle localization: anatomical landmarks and ultrasound guidance   -ultrasound image captured on disc.  Assessment  Injection assessment: negative aspiration, negative parasthesia and local visualized surrounding nerve  Paresthesia pain: none  Heart rate change: no  Slow fractionated injection: yes  Additional Notes  VSS.  [facility] RN monitoring vitals throughout procedure.  Patient tolerated procedure well.

## 2019-04-25 NOTE — BRIEF OP NOTE
BRIEF OP NOTE    Preop Dx: Left trimalleolar ankle fracture with separate anterior lateral tibia fragment    Status post reduction and fixation of left calcaneus fracture    Postop Dx: Left trimalleolar ankle fracture with separate anterior lateral tibia fragment    Status post reduction and fixation of left calcaneus fracture    Procedure: 1.  Open treatment of left trimalleolar ankle fracture with internal fixation of medial and lateral malleoli, without fixation of posterior lip - 27822    2.  Open treatment of left ankle syndesmosis injury with internal fixation - 27829    Surgeon: Goldy Williamson M.D.    Asst:  Kecia Aleman M.D    Anesthesia: GETA    EBL:  20cc    IVF:  1200cc crystalloid    Implants: Synthes   Implant Name Type Inv. Item Serial No.  Lot No. LRB No. Used   PLATE BONE [id number] [id number] LF - [id number]  PLATE BONE 6H [id number] LF  "[employer]  Left 1   SCREW CORTEX 3.5MM X 14MM. - [id number]  SCREW CORTEX 3.5MM X 14MM.  SYNTHES  Left 1   SCREW CORTEX 3.5MM X 16MM - [id number]  SCREW CORTEX 3.5MM X 16MM  [employer]  Left 1   SCREW CORTEX 2.7 X 22 - [id number]  SCREW CORTEX 2.7 X 22  [employer]  Left 1   SCREW CRTX LOW PROFILE H 50MM - [id number]  SCREW CRTX LOW PROFILE H 50MM  [employer] [id number] Left 1   SCREW 2.7X16MM - [id number]  SCREW 2.7X16MM  SY[employer]ES  Left 1   SCREW STRDRV REC T8 2.7X12 SS - [id number]  SCREW STRDRV REC T8 2.7X12 SS  SYN[employer]ES  Left 1   SCREW STRDRV REC T8 2.7X10 SS - [id number]  SCREW STRDRV REC T8 2.7X10 SS  SYNTHES  Left 1   SCREW 2.7X18MM - [id number]  SCREW 2.7X18MM  SYN[employer]ES  Left 1   SCREW CRTX LOW PROFILE H 42MM - [id number]  SCREW CRTX LOW PROFILE H 42MM  SYN[employer]ES [id number] Left 1   SCREW CRTX LOW PROFILE H 75MM - [id number]  SCREW CRTX LOW PROFILE H 75MM  SY[employer]ES [id number] Left 1         Specimens: None    Findings: Stable fixation with good alignment    Dispo:  To PACU extubated/stable     Dict#  [id number]

## 2019-04-25 NOTE — INTERVAL H&P NOTE
No change in H&P.  To OR for ORIF right ankle.  S/P ORIF right calcaneus fracture.  Skin wrinkles anterior but will not be able to visualize medial and lateral side until splint off in OR.  The risks, benefits and alternatives to surgery were discussed with the patient at great length.  These include bleeding, infection, vessel/nerve damage, pain, numbness, tingling, complex regional pain syndrome, hardware/surgical failure, need for further surgery, malunion, nonunion, DVT, PE, arthritis and death.  Patient states an understanding and wishes to proceed with surgery.   All questions were answered.  No guarantees were implied or stated.  Informed consent was obtained.            Active Hospital Problems    Diagnosis  POA    Bimalleolar ankle fracture, left, closed, initial encounter [L15.800F]  Yes      Resolved Hospital Problems   No resolved problems to display.

## 2019-04-25 NOTE — TRANSFER OF CARE
"Anesthesia Transfer of Care Note    Patient: Charo Padron    Procedure(s) Performed: Procedure(s) (LRB):  ORIF, ANKLE LEFT (Left)    Patient location: PACU    Anesthesia Type: general    Transport from OR: Transported from OR on 6-10 L/min O2 by face mask with adequate spontaneous ventilation    Post pain: adequate analgesia    Post assessment: no apparent anesthetic complications and tolerated procedure well    Post vital signs: stable    Level of consciousness: awake and alert    Nausea/Vomiting: no vomiting    Complications: none    Transfer of care protocol was followed      Last vitals:   Visit Vitals  /82 (BP Location: Left arm, Patient Position: Lying)   Pulse 86   Temp 36.7 °C (98.1 °F) (Temporal)   Resp 16   Ht 5' 5" (1.651 m)   Wt 65.8 kg (145 lb)   SpO2 100%   BMI 24.13 kg/m²     "
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
## 2019-04-28 RX ORDER — PROMETHAZINE HYDROCHLORIDE 25 MG/1
25 TABLET ORAL EVERY 6 HOURS PRN
Qty: 30 TABLET | Refills: 0 | Status: SHIPPED | OUTPATIENT
Start: 2019-04-28 | End: 2019-04-28

## 2019-04-28 RX ORDER — PROMETHAZINE HYDROCHLORIDE 25 MG/1
25 TABLET ORAL EVERY 6 HOURS PRN
Qty: 30 TABLET | Refills: 0 | Status: SHIPPED | OUTPATIENT
Start: 2019-04-28 | End: 2020-09-04

## 2019-04-29 ENCOUNTER — NURSE TRIAGE (OUTPATIENT)
Dept: ADMINISTRATIVE | Facility: CLINIC | Age: 39
End: 2019-04-29

## 2019-04-29 NOTE — TELEPHONE ENCOUNTER
Called patient and left message to return my call At this time he will get OTC peppermint water 5 mL. If this does not help his hiccups resolve then we will consider Elavil 10 mg 1 Po  tid    Reason for Disposition   [1] MILD or MODERATE vomiting AND [2] present > 48 hours (2 days) (Exception: mild vomiting with associated diarrhea)   [1] Hiccups present > 24 hours AND [2] nearly continuous    Protocols used: VOMITING-A-, LGIABUB-T-ZD    Charo reporting he has been having vomiting intermittently since he was discharged from the hospital. He has vomited 3 times today, but is tolerating fluids and urinating. He has not taken percocet today. He had a left ankle orif. He believes the vomiting is being caused by continuous hiccups he has had since he had surgery. Discussed home care advice as documented. Recommended he reach out to surgeon's office tomorrow to see if he can be fit in for appt.

## 2019-05-03 ENCOUNTER — CLINICAL SUPPORT (OUTPATIENT)
Dept: REHABILITATION | Facility: HOSPITAL | Age: 39
End: 2019-05-03
Attending: ORTHOPAEDIC SURGERY
Payer: MEDICAID

## 2019-05-03 DIAGNOSIS — M25.572 ACUTE LEFT ANKLE PAIN: ICD-10-CM

## 2019-05-03 DIAGNOSIS — M25.672 DECREASED RANGE OF MOTION OF LEFT ANKLE: ICD-10-CM

## 2019-05-03 DIAGNOSIS — R26.2 DIFFICULTY IN WALKING: ICD-10-CM

## 2019-05-03 PROCEDURE — 97110 THERAPEUTIC EXERCISES: CPT

## 2019-05-03 PROCEDURE — 97161 PT EVAL LOW COMPLEX 20 MIN: CPT
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
## 2019-05-10 ENCOUNTER — TELEPHONE (OUTPATIENT)
Dept: ORTHOPEDICS | Facility: CLINIC | Age: 39
End: 2019-05-10

## 2019-05-10 NOTE — TELEPHONE ENCOUNTER
Left message for patient to return call.  Regarding pt missed on 5/10/19. Pt is reschedule to 5/13/19.

## 2019-05-14 ENCOUNTER — OFFICE VISIT (OUTPATIENT)
Dept: ORTHOPEDICS | Facility: CLINIC | Age: 39
End: 2019-05-14
Payer: MEDICAID

## 2019-05-14 VITALS
TEMPERATURE: 98 F | RESPIRATION RATE: 18 BRPM | HEART RATE: 92 BPM | SYSTOLIC BLOOD PRESSURE: 117 MMHG | DIASTOLIC BLOOD PRESSURE: 76 MMHG
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
## 2019-05-14 DIAGNOSIS — Z98.890 S/P ORIF (OPEN REDUCTION INTERNAL FIXATION) FRACTURE: ICD-10-CM

## 2019-05-14 DIAGNOSIS — Z87.81 S/P ORIF (OPEN REDUCTION INTERNAL FIXATION) FRACTURE: ICD-10-CM

## 2019-05-14 DIAGNOSIS — S82.841A CLOSED BIMALLEOLAR FRACTURE OF RIGHT ANKLE, INITIAL ENCOUNTER: Primary | ICD-10-CM

## 2019-05-14 PROCEDURE — 99024 PR POST-OP FOLLOW-UP VISIT: ICD-10-PCS | Mod: ,,, | Performed by: PHYSICIAN ASSISTANT

## 2019-05-14 PROCEDURE — 99213 OFFICE O/P EST LOW 20 MIN: CPT | Mod: PBBFAC | Performed by: PHYSICIAN ASSISTANT

## 2019-05-14 PROCEDURE — 99024 POSTOP FOLLOW-UP VISIT: CPT | Mod: ,,, | Performed by: PHYSICIAN ASSISTANT

## 2019-05-14 PROCEDURE — 99999 PR PBB SHADOW E&M-EST. PATIENT-LVL III: CPT | Mod: PBBFAC,,, | Performed by: PHYSICIAN ASSISTANT

## 2019-05-14 PROCEDURE — 99999 PR PBB SHADOW E&M-EST. PATIENT-LVL III: ICD-10-PCS | Mod: PBBFAC,,, | Performed by: PHYSICIAN ASSISTANT

## 2019-05-14 RX ORDER — HYDROCODONE BITARTRATE AND ACETAMINOPHEN 10; 325 MG/1; MG/1
1 TABLET ORAL
Qty: 42 TABLET | Refills: 0 | Status: SHIPPED | OUTPATIENT
Start: 2019-05-14 | End: 2019-06-25

## 2019-05-14 NOTE — PROGRESS NOTES
LT L&U splint removal and applied LT SLC ordered by SHELL Good. Skin intact with no redness or bruising.
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
## 2019-06-05 ENCOUNTER — TELEPHONE (OUTPATIENT)
Dept: ORTHOPEDICS | Facility: CLINIC | Age: 39
End: 2019-06-05

## 2019-06-05 NOTE — TELEPHONE ENCOUNTER
After speaking to pt and getting his permission to release requested information,  I spoke with Christel at [facility]'s HR dept.   Advised that pt will be NWB 10-12 wks pending fx healing     Pt's next appointment is 6/25/19.    Pt will need an updated work note from provider at that time.   Christel will make a note in pt's HR file.   Pt notified and updated on possible out of work time frame as requested.

## 2019-06-25 ENCOUNTER — HOSPITAL ENCOUNTER (OUTPATIENT)
Dept: RADIOLOGY | Facility: HOSPITAL | Age: 39
Discharge: HOME | End: 2019-06-25
Attending: PHYSICIAN ASSISTANT
Payer: MEDICAID

## 2019-06-25 ENCOUNTER — OFFICE VISIT (OUTPATIENT)
Dept: ORTHOPEDICS | Facility: CLINIC | Age: 39
End: 2019-06-25
Payer: MEDICAID

## 2019-06-25 DIAGNOSIS — S92.002D CLOSED DISPLACED FRACTURE OF LEFT CALCANEUS WITH ROUTINE HEALING, UNSPECIFIED PORTION OF CALCANEUS, SUBSEQUENT ENCOUNTER: ICD-10-CM

## 2019-06-25 DIAGNOSIS — M25.572 ACUTE LEFT ANKLE PAIN: ICD-10-CM

## 2019-06-25 DIAGNOSIS — G89.18 POST-OPERATIVE PAIN: ICD-10-CM

## 2019-06-25 DIAGNOSIS — S82.841D CLOSED BIMALLEOLAR FRACTURE OF RIGHT ANKLE WITH ROUTINE HEALING, SUBSEQUENT ENCOUNTER: Primary | ICD-10-CM

## 2019-06-25 PROCEDURE — 73610 XR ANKLE COMPLETE 3 VIEW LEFT: ICD-10-PCS | Mod: 26,LT,, | Performed by: RADIOLOGY

## 2019-06-25 PROCEDURE — 73650 X-RAY EXAM OF HEEL: CPT | Mod: TC,LT

## 2019-06-25 PROCEDURE — 73650 X-RAY EXAM OF HEEL: CPT | Mod: 26,LT,, | Performed by: RADIOLOGY

## 2019-06-25 PROCEDURE — 99213 OFFICE O/P EST LOW 20 MIN: CPT | Mod: PBBFAC,25 | Performed by: PHYSICIAN ASSISTANT

## 2019-06-25 PROCEDURE — 99024 PR POST-OP FOLLOW-UP VISIT: ICD-10-PCS | Mod: ,,, | Performed by: PHYSICIAN ASSISTANT

## 2019-06-25 PROCEDURE — 99999 PR PBB SHADOW E&M-EST. PATIENT-LVL III: CPT | Mod: PBBFAC,,, | Performed by: PHYSICIAN ASSISTANT

## 2019-06-25 PROCEDURE — 99999 PR PBB SHADOW E&M-EST. PATIENT-LVL III: ICD-10-PCS | Mod: PBBFAC,,, | Performed by: PHYSICIAN ASSISTANT

## 2019-06-25 PROCEDURE — 73610 X-RAY EXAM OF ANKLE: CPT | Mod: TC,LT

## 2019-06-25 PROCEDURE — 73610 X-RAY EXAM OF ANKLE: CPT | Mod: 26,LT,, | Performed by: RADIOLOGY

## 2019-06-25 PROCEDURE — 73650 XR CALCANEUS 2 VIEW LEFT: ICD-10-PCS | Mod: 26,LT,, | Performed by: RADIOLOGY

## 2019-06-25 PROCEDURE — 99024 POSTOP FOLLOW-UP VISIT: CPT | Mod: ,,, | Performed by: PHYSICIAN ASSISTANT

## 2019-06-25 RX ORDER — HYDROCODONE BITARTRATE AND ACETAMINOPHEN 5; 325 MG/1; MG/1
1 TABLET ORAL
Qty: 42 TABLET | Refills: 0 | Status: SHIPPED | OUTPATIENT
Start: 2019-06-25 | End: 2019-08-06
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
## 2019-08-06 ENCOUNTER — HOSPITAL ENCOUNTER (OUTPATIENT)
Dept: RADIOLOGY | Facility: HOSPITAL | Age: 39
Discharge: HOME | End: 2019-08-06
Attending: PHYSICIAN ASSISTANT
Payer: MEDICAID

## 2019-08-06 ENCOUNTER — OFFICE VISIT (OUTPATIENT)
Dept: ORTHOPEDICS | Facility: CLINIC | Age: 39
End: 2019-08-06
Payer: MEDICAID

## 2019-08-06 DIAGNOSIS — S82.841D CLOSED BIMALLEOLAR FRACTURE OF RIGHT ANKLE WITH ROUTINE HEALING, SUBSEQUENT ENCOUNTER: ICD-10-CM

## 2019-08-06 DIAGNOSIS — S92.002D CLOSED DISPLACED FRACTURE OF LEFT CALCANEUS WITH ROUTINE HEALING, UNSPECIFIED PORTION OF CALCANEUS, SUBSEQUENT ENCOUNTER: Primary | ICD-10-CM

## 2019-08-06 DIAGNOSIS — M25.572 ACUTE LEFT ANKLE PAIN: ICD-10-CM
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
## 2019-08-06 DIAGNOSIS — S92.002D CLOSED DISPLACED FRACTURE OF LEFT CALCANEUS WITH ROUTINE HEALING, UNSPECIFIED PORTION OF CALCANEUS, SUBSEQUENT ENCOUNTER: ICD-10-CM

## 2019-08-06 PROCEDURE — 99213 PR OFFICE/OUTPT VISIT, EST, LEVL III, 20-29 MIN: ICD-10-PCS | Mod: S$PBB,,, | Performed by: PHYSICIAN ASSISTANT

## 2019-08-06 PROCEDURE — 73650 X-RAY EXAM OF HEEL: CPT | Mod: 26,LT,, | Performed by: RADIOLOGY

## 2019-08-06 PROCEDURE — 73650 XR CALCANEUS 2 VIEW LEFT: ICD-10-PCS | Mod: 26,LT,, | Performed by: RADIOLOGY

## 2019-08-06 PROCEDURE — 99999 PR PBB SHADOW E&M-EST. PATIENT-LVL III: ICD-10-PCS | Mod: PBBFAC,,, | Performed by: PHYSICIAN ASSISTANT

## 2019-08-06 PROCEDURE — 73650 X-RAY EXAM OF HEEL: CPT | Mod: TC,LT

## 2019-08-06 PROCEDURE — 99213 OFFICE O/P EST LOW 20 MIN: CPT | Mod: S$PBB,,, | Performed by: PHYSICIAN ASSISTANT

## 2019-08-06 PROCEDURE — 73610 XR ANKLE COMPLETE 3 VIEW LEFT: ICD-10-PCS | Mod: 26,LT,, | Performed by: RADIOLOGY

## 2019-08-06 PROCEDURE — 73610 X-RAY EXAM OF ANKLE: CPT | Mod: TC,LT

## 2019-08-06 PROCEDURE — 99213 OFFICE O/P EST LOW 20 MIN: CPT | Mod: PBBFAC,25 | Performed by: PHYSICIAN ASSISTANT

## 2019-08-06 PROCEDURE — 73610 X-RAY EXAM OF ANKLE: CPT | Mod: 26,LT,, | Performed by: RADIOLOGY

## 2019-08-06 PROCEDURE — 99999 PR PBB SHADOW E&M-EST. PATIENT-LVL III: CPT | Mod: PBBFAC,,, | Performed by: PHYSICIAN ASSISTANT

## 2019-08-06 RX ORDER — HYDROCODONE BITARTRATE AND ACETAMINOPHEN 10; 325 MG/1; MG/1
1 TABLET ORAL
Qty: 15 TABLET | Refills: 0 | Status: SHIPPED | OUTPATIENT
Start: 2019-08-06 | End: 2020-09-04
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
## 2019-08-29 ENCOUNTER — CLINICAL SUPPORT (OUTPATIENT)
Dept: REHABILITATION | Facility: HOSPITAL | Age: 39
End: 2019-08-29
Attending: PHYSICIAN ASSISTANT
Payer: MEDICAID

## 2019-08-29 ENCOUNTER — TELEPHONE (OUTPATIENT)
Dept: ORTHOPEDICS | Facility: CLINIC | Age: 39
End: 2019-08-29

## 2019-08-29 DIAGNOSIS — M25.572 ACUTE LEFT ANKLE PAIN: ICD-10-CM

## 2019-08-29 DIAGNOSIS — R26.9 GAIT ABNORMALITY: ICD-10-CM

## 2019-08-29 DIAGNOSIS — R29.898 ANKLE WEAKNESS: ICD-10-CM

## 2019-08-29 PROBLEM — M25.579 ANKLE PAIN: Status: ACTIVE | Noted: 2019-08-29

## 2019-08-29 PROBLEM — M25.673 DECREASED ROM OF ANKLE: Status: ACTIVE | Noted: 2019-08-29

## 2019-08-29 PROCEDURE — 97161 PT EVAL LOW COMPLEX 20 MIN: CPT

## 2019-08-29 PROCEDURE — 97110 THERAPEUTIC EXERCISES: CPT

## 2019-08-29 NOTE — TELEPHONE ENCOUNTER
----- Message from Fatmata Good PA-C sent at 8/29/2019 12:41 PM CDT -----  Contact: Self/ [phone]  Unable to refill  Due to policy, does he have appointment with pain management or PCP  ----- Message -----  From: Roxy Callejas MA  Sent: 8/28/2019  11:54 AM  To: Fatmata Good PA-C        ----- Message -----  From: Tr Givens  Sent: 8/28/2019  11:45 AM  To: Florentino Avilez Staff    Patient would like to see about getting a refill on Oxycodone percocet. Patient said his HYDROcodone-acetaminophen (NORCO)  mg keep him up all night. [id number] phone number [phone].

## 2019-08-29 NOTE — TELEPHONE ENCOUNTER
Notified pt that he can call [facility]  [address], [address]  [place] [address]  Phone: [phone]  Fax: [phone] or reach out to his primary care provider. Pt is aware theirs no refills on his pain medication, per SHELL Rose. Patient states verbal understanding and has no further questions.
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
## 2019-09-06 ENCOUNTER — CLINICAL SUPPORT (OUTPATIENT)
Dept: REHABILITATION | Facility: HOSPITAL | Age: 39
End: 2019-09-06
Attending: PHYSICIAN ASSISTANT
Payer: MEDICAID

## 2019-09-06 DIAGNOSIS — R29.898 ANKLE WEAKNESS: ICD-10-CM

## 2019-09-06 DIAGNOSIS — M25.673 DECREASED RANGE OF MOTION OF ANKLE, UNSPECIFIED LATERALITY: ICD-10-CM

## 2019-09-06 DIAGNOSIS — M25.572 ACUTE LEFT ANKLE PAIN: ICD-10-CM

## 2019-09-06 DIAGNOSIS — R26.9 GAIT ABNORMALITY: ICD-10-CM

## 2019-09-06 PROCEDURE — 97110 THERAPEUTIC EXERCISES: CPT
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
## 2019-09-16 ENCOUNTER — CLINICAL SUPPORT (OUTPATIENT)
Dept: REHABILITATION | Facility: HOSPITAL | Age: 39
End: 2019-09-16
Attending: PHYSICIAN ASSISTANT
Payer: MEDICAID

## 2019-09-16 ENCOUNTER — HOSPITAL ENCOUNTER (OUTPATIENT)
Dept: RADIOLOGY | Facility: HOSPITAL | Age: 39
Discharge: HOME | End: 2019-09-16
Attending: PHYSICIAN ASSISTANT
Payer: MEDICAID

## 2019-09-16 DIAGNOSIS — M25.673 DECREASED RANGE OF MOTION OF ANKLE, UNSPECIFIED LATERALITY: ICD-10-CM

## 2019-09-16 DIAGNOSIS — M25.572 ACUTE LEFT ANKLE PAIN: ICD-10-CM

## 2019-09-16 DIAGNOSIS — R29.898 ANKLE WEAKNESS: ICD-10-CM

## 2019-09-16 DIAGNOSIS — S92.002D CLOSED DISPLACED FRACTURE OF LEFT CALCANEUS WITH ROUTINE HEALING, UNSPECIFIED PORTION OF CALCANEUS, SUBSEQUENT ENCOUNTER: ICD-10-CM

## 2019-09-16 DIAGNOSIS — S92.002D CLOSED DISPLACED FRACTURE OF LEFT CALCANEUS WITH ROUTINE HEALING, UNSPECIFIED PORTION OF CALCANEUS, SUBSEQUENT ENCOUNTER: Primary | ICD-10-CM

## 2019-09-16 DIAGNOSIS — R26.9 GAIT ABNORMALITY: ICD-10-CM

## 2019-09-16 PROCEDURE — 97110 THERAPEUTIC EXERCISES: CPT
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
## 2019-09-17 ENCOUNTER — TELEPHONE (OUTPATIENT)
Dept: ORTHOPEDICS | Facility: CLINIC | Age: 39
End: 2019-09-17

## 2019-09-17 ENCOUNTER — OFFICE VISIT (OUTPATIENT)
Dept: ORTHOPEDICS | Facility: CLINIC | Age: 39
End: 2019-09-17
Payer: MEDICAID

## 2019-09-17 ENCOUNTER — HOSPITAL ENCOUNTER (OUTPATIENT)
Dept: RADIOLOGY | Facility: HOSPITAL | Age: 39
Discharge: HOME | End: 2019-09-17
Attending: PHYSICIAN ASSISTANT
Payer: MEDICAID

## 2019-09-17 DIAGNOSIS — Z98.890 S/P ORIF (OPEN REDUCTION INTERNAL FIXATION) FRACTURE: ICD-10-CM

## 2019-09-17 DIAGNOSIS — S92.002D CLOSED DISPLACED FRACTURE OF LEFT CALCANEUS WITH ROUTINE HEALING, UNSPECIFIED PORTION OF CALCANEUS, SUBSEQUENT ENCOUNTER: Primary | ICD-10-CM
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
## 2019-09-17 DIAGNOSIS — S82.841D CLOSED BIMALLEOLAR FRACTURE OF RIGHT ANKLE WITH ROUTINE HEALING, SUBSEQUENT ENCOUNTER: ICD-10-CM

## 2019-09-17 DIAGNOSIS — Z87.81 S/P ORIF (OPEN REDUCTION INTERNAL FIXATION) FRACTURE: ICD-10-CM

## 2019-09-17 PROCEDURE — 73650 X-RAY EXAM OF HEEL: CPT | Mod: 26,LT,, | Performed by: RADIOLOGY

## 2019-09-17 PROCEDURE — 99213 OFFICE O/P EST LOW 20 MIN: CPT | Mod: PBBFAC,25 | Performed by: PHYSICIAN ASSISTANT

## 2019-09-17 PROCEDURE — 73650 XR CALCANEUS 2 VIEW LEFT: ICD-10-PCS | Mod: 26,LT,, | Performed by: RADIOLOGY

## 2019-09-17 PROCEDURE — 99999 PR PBB SHADOW E&M-EST. PATIENT-LVL III: ICD-10-PCS | Mod: PBBFAC,,, | Performed by: PHYSICIAN ASSISTANT

## 2019-09-17 PROCEDURE — 73650 X-RAY EXAM OF HEEL: CPT | Mod: TC,LT

## 2019-09-17 PROCEDURE — 99213 OFFICE O/P EST LOW 20 MIN: CPT | Mod: S$PBB,,, | Performed by: PHYSICIAN ASSISTANT

## 2019-09-17 PROCEDURE — 99213 PR OFFICE/OUTPT VISIT, EST, LEVL III, 20-29 MIN: ICD-10-PCS | Mod: S$PBB,,, | Performed by: PHYSICIAN ASSISTANT

## 2019-09-17 PROCEDURE — 73610 XR ANKLE COMPLETE 3 VIEW LEFT: ICD-10-PCS | Mod: 26,LT,, | Performed by: RADIOLOGY

## 2019-09-17 PROCEDURE — 73610 X-RAY EXAM OF ANKLE: CPT | Mod: 26,LT,, | Performed by: RADIOLOGY

## 2019-09-17 PROCEDURE — 73610 X-RAY EXAM OF ANKLE: CPT | Mod: TC,LT

## 2019-09-17 PROCEDURE — 99999 PR PBB SHADOW E&M-EST. PATIENT-LVL III: CPT | Mod: PBBFAC,,, | Performed by: PHYSICIAN ASSISTANT

## 2019-09-17 RX ORDER — TRAMADOL HYDROCHLORIDE 50 MG/1
50 TABLET ORAL
Qty: 42 TABLET | Refills: 0 | Status: SHIPPED | OUTPATIENT
Start: 2019-09-17 | End: 2019-09-24

## 2019-09-17 RX ORDER — MELOXICAM 15 MG/1
15 TABLET ORAL DAILY
Qty: 30 TABLET | Refills: 3 | Status: SHIPPED | OUTPATIENT
Start: 2019-09-17 | End: 2019-10-17

## 2019-09-17 NOTE — TELEPHONE ENCOUNTER
----- Message from Jose C Dong sent at 9/17/2019 11:17 AM CDT -----  Contact: patient   Please call pt at [phone]    Patient is running late for his x-rays due to transportation    Thank you  
Notified pt. Pt will still be seen, after lunch. Patient states verbal understanding and has no further questions.      
day(s)
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
## 2019-09-19 ENCOUNTER — TELEPHONE (OUTPATIENT)
Dept: ORTHOPEDICS | Facility: CLINIC | Age: 39
End: 2019-09-19

## 2019-09-19 NOTE — TELEPHONE ENCOUNTER
Called patient and left message to return my call    ----- Message from Roxy Callejas MA sent at 9/18/2019  2:54 PM CDT -----  Contact: pt  Notified pt that CONCHITA Good PA-C is not in the office today 9/18/19. She will retrieve pt message on tomorrow 9/19/19. Patient states verbal understanding and has no further questions.    ----- Message -----  From: Bria Gupta  Sent: 9/18/2019   2:50 PM  To: Florentino Avilez Staff    Reason: Pt ask when do he have to schedule another surgery if needed    Communication: [phone]

## 2019-09-24 ENCOUNTER — TELEPHONE (OUTPATIENT)
Dept: REHABILITATION | Facility: HOSPITAL | Age: 39
End: 2019-09-24

## 2019-09-24 NOTE — TELEPHONE ENCOUNTER
"Dilcia Oneal () contacted Charo Padron regarding physical therapy appointment on 9/24/2019. Pt cancelled appointment today because he is "scheduled for surgery and will return if he is able to". Charo did not inform us on what type of surgery he was receiving and he did not inform us on the time/date of the surgery.   "

## 2019-10-31 ENCOUNTER — TELEPHONE (OUTPATIENT)
Dept: ORTHOPEDICS | Facility: CLINIC | Age: 39
End: 2019-10-31

## 2019-10-31 NOTE — TELEPHONE ENCOUNTER
----- Message from Fatmata Good PA-C sent at 10/31/2019 12:40 PM CDT -----  Contact: self/[phone]  Please make patient a sooner appointment to discuss his below concerns.   Fatmata Shah.     ----- Message -----  From: Roxy Callejas MA  Sent: 10/23/2019   2:26 PM CDT  To: Fatmata Good PA-C        ----- Message -----  From: Yulisa Moon MA  Sent: 10/23/2019   1:43 PM CDT  To: Florentino Avilez Staff    Pt is calling to speak with  in regards to his surgery and when can he go back to work.

## 2019-10-31 NOTE — TELEPHONE ENCOUNTER
Called and Informed patient of appointment on 11/12/19 @ 10:30 am and mailed.  Patient states verbal understanding and has no further questions.

## 2019-11-10 ENCOUNTER — HOSPITAL ENCOUNTER (EMERGENCY)
Facility: HOSPITAL | Age: 39
Discharge: HOME | End: 2019-11-10
Attending: EMERGENCY MEDICINE
Payer: MEDICAID

## 2019-11-10 VITALS
SYSTOLIC BLOOD PRESSURE: 144 MMHG | RESPIRATION RATE: 16 BRPM | WEIGHT: 140 LBS | BODY MASS INDEX: 23.32 KG/M2 | DIASTOLIC BLOOD PRESSURE: 95 MMHG | HEIGHT: 65 IN | HEART RATE: 72 BPM | OXYGEN SATURATION: 100 % | TEMPERATURE: 98 F

## 2019-11-10 DIAGNOSIS — S99.912A LEFT ANKLE INJURY: ICD-10-CM
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
## 2019-11-10 DIAGNOSIS — M54.9 LEFT-SIDED BACK PAIN, UNSPECIFIED BACK LOCATION, UNSPECIFIED CHRONICITY: ICD-10-CM

## 2019-11-10 PROCEDURE — 99285 EMERGENCY DEPT VISIT HI MDM: CPT | Mod: 25

## 2019-11-10 PROCEDURE — 99284 EMERGENCY DEPT VISIT MOD MDM: CPT | Mod: ,,, | Performed by: PHYSICIAN ASSISTANT

## 2019-11-10 PROCEDURE — 25000003 PHARM REV CODE 250: Performed by: PHYSICIAN ASSISTANT

## 2019-11-10 PROCEDURE — 99284 PR EMERGENCY DEPT VISIT,LEVEL IV: ICD-10-PCS | Mod: ,,, | Performed by: PHYSICIAN ASSISTANT

## 2019-11-10 RX ORDER — METHOCARBAMOL 500 MG/1
1000 TABLET, FILM COATED ORAL 3 TIMES DAILY PRN
Qty: 20 TABLET | Refills: 0 | Status: SHIPPED | OUTPATIENT
Start: 2019-11-10 | End: 2019-11-15

## 2019-11-10 RX ORDER — NAPROXEN 500 MG/1
500 TABLET ORAL 2 TIMES DAILY WITH MEALS
Qty: 20 TABLET | Refills: 0 | Status: SHIPPED | OUTPATIENT
Start: 2019-11-10 | End: 2020-09-04

## 2019-11-10 RX ORDER — ACETAMINOPHEN 500 MG
1000 TABLET ORAL
Status: COMPLETED | OUTPATIENT
Start: 2019-11-10 | End: 2019-11-10

## 2019-11-10 RX ADMIN — ACETAMINOPHEN 1000 MG: 500 TABLET ORAL at 07:11
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
## 2019-11-11 NOTE — DISCHARGE INSTRUCTIONS
Your x-ray does not show any evidence of a fracture or hardware complication of your ankle  You may take Naprosyn as directed for ongoing management of your pain. Continue using your post-operative boot for ambulation assistance  Follow-up with your primary care provider for further management    Return to the emergency room for new, worsening, or concerning symptoms
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
## 2019-11-12 ENCOUNTER — HOSPITAL ENCOUNTER (OUTPATIENT)
Dept: RADIOLOGY | Facility: HOSPITAL | Age: 39
Discharge: HOME | End: 2019-11-12
Attending: PHYSICIAN ASSISTANT
Payer: MEDICAID

## 2019-11-12 ENCOUNTER — OFFICE VISIT (OUTPATIENT)
Dept: ORTHOPEDICS | Facility: CLINIC | Age: 39
End: 2019-11-12
Payer: MEDICAID

## 2019-11-12 VITALS — WEIGHT: 140 LBS | BODY MASS INDEX: 23.32 KG/M2 | HEIGHT: 65 IN

## 2019-11-12 DIAGNOSIS — M25.572 ACUTE LEFT ANKLE PAIN: ICD-10-CM

## 2019-11-12 DIAGNOSIS — Z98.890 S/P ORIF (OPEN REDUCTION INTERNAL FIXATION) FRACTURE: ICD-10-CM

## 2019-11-12 DIAGNOSIS — Z87.81 S/P ORIF (OPEN REDUCTION INTERNAL FIXATION) FRACTURE: ICD-10-CM

## 2019-11-12 DIAGNOSIS — S92.002D CLOSED DISPLACED FRACTURE OF LEFT CALCANEUS WITH ROUTINE HEALING, UNSPECIFIED PORTION OF CALCANEUS, SUBSEQUENT ENCOUNTER: Primary | ICD-10-CM

## 2019-11-12 DIAGNOSIS — S92.002D CLOSED DISPLACED FRACTURE OF LEFT CALCANEUS WITH ROUTINE HEALING, UNSPECIFIED PORTION OF CALCANEUS, SUBSEQUENT ENCOUNTER: ICD-10-CM

## 2019-11-12 DIAGNOSIS — S82.841D CLOSED BIMALLEOLAR FRACTURE OF RIGHT ANKLE WITH ROUTINE HEALING, SUBSEQUENT ENCOUNTER: ICD-10-CM

## 2019-11-12 PROCEDURE — 99999 PR PBB SHADOW E&M-EST. PATIENT-LVL III: ICD-10-PCS | Mod: PBBFAC,,, | Performed by: PHYSICIAN ASSISTANT

## 2019-11-12 PROCEDURE — 99213 OFFICE O/P EST LOW 20 MIN: CPT | Mod: S$PBB,,, | Performed by: PHYSICIAN ASSISTANT

## 2019-11-12 PROCEDURE — 73650 XR CALCANEUS 2 VIEW LEFT: ICD-10-PCS | Mod: 26,LT,, | Performed by: RADIOLOGY

## 2019-11-12 PROCEDURE — 73650 X-RAY EXAM OF HEEL: CPT | Mod: 26,LT,, | Performed by: RADIOLOGY

## 2019-11-12 PROCEDURE — 73610 XR ANKLE COMPLETE 3 VIEW LEFT: ICD-10-PCS | Mod: 26,LT,, | Performed by: RADIOLOGY

## 2019-11-12 PROCEDURE — 73610 X-RAY EXAM OF ANKLE: CPT | Mod: 26,LT,, | Performed by: RADIOLOGY

## 2019-11-12 PROCEDURE — 73650 X-RAY EXAM OF HEEL: CPT | Mod: TC,LT

## 2019-11-12 PROCEDURE — 99213 PR OFFICE/OUTPT VISIT, EST, LEVL III, 20-29 MIN: ICD-10-PCS | Mod: S$PBB,,, | Performed by: PHYSICIAN ASSISTANT

## 2019-11-12 PROCEDURE — 99999 PR PBB SHADOW E&M-EST. PATIENT-LVL III: CPT | Mod: PBBFAC,,, | Performed by: PHYSICIAN ASSISTANT

## 2019-11-12 PROCEDURE — 73610 X-RAY EXAM OF ANKLE: CPT | Mod: TC,LT

## 2019-11-12 PROCEDURE — 99213 OFFICE O/P EST LOW 20 MIN: CPT | Mod: PBBFAC,25 | Performed by: PHYSICIAN ASSISTANT
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
## 2019-12-11 ENCOUNTER — HOSPITAL ENCOUNTER (EMERGENCY)
Facility: HOSPITAL | Age: 39
Discharge: HOME | End: 2019-12-11
Attending: EMERGENCY MEDICINE
Payer: MEDICAID

## 2019-12-11 VITALS
HEIGHT: 65 IN | BODY MASS INDEX: 23.32 KG/M2 | RESPIRATION RATE: 18 BRPM | OXYGEN SATURATION: 100 % | TEMPERATURE: 97 F | DIASTOLIC BLOOD PRESSURE: 63 MMHG | WEIGHT: 140 LBS | SYSTOLIC BLOOD PRESSURE: 131 MMHG | HEART RATE: 68 BPM

## 2019-12-11 DIAGNOSIS — H61.23 BILATERAL IMPACTED CERUMEN: Primary | ICD-10-CM

## 2019-12-11 PROCEDURE — 99284 PR EMERGENCY DEPT VISIT,LEVEL IV: ICD-10-PCS | Mod: 25,,, | Performed by: PHYSICIAN ASSISTANT

## 2019-12-11 PROCEDURE — 69210 REMOVE IMPACTED EAR WAX UNI: CPT | Mod: 50

## 2019-12-11 PROCEDURE — 99283 EMERGENCY DEPT VISIT LOW MDM: CPT | Mod: 25

## 2019-12-11 PROCEDURE — 69210 PR REMOVAL IMPACTED CERUMEN REQUIRING INSTRUMENTATION, UNILATERAL: ICD-10-PCS | Mod: 50,,, | Performed by: PHYSICIAN ASSISTANT

## 2019-12-11 PROCEDURE — 99284 EMERGENCY DEPT VISIT MOD MDM: CPT | Mod: 25,,, | Performed by: PHYSICIAN ASSISTANT

## 2019-12-11 PROCEDURE — 69210 REMOVE IMPACTED EAR WAX UNI: CPT | Mod: 50,,, | Performed by: PHYSICIAN ASSISTANT

## 2019-12-11 NOTE — ED NOTES
LOC: The patient is awake and alert; oriented x 3 and speaking appropriately.  APPEARANCE: Patient resting comfortably, patient is clean and well groomed  SKIN: warm and dry, normal skin turgor & moist mucus membranes, skin intact, no breakdown noted.  MUSCULOSKELETAL: Patient moving all extremities well, no obvious swelling or deformities noted  RESPIRATORY: Airway is open and patent, ; respirations are spontaneous, normal effort and rate  CARDIAC: Patient has a normal rate, no peripheral edema noted, capillary refill < 3 seconds; No complaints of chest pain   ABDOMEN: Soft and non tender to palpation, no distention noted.

## 2019-12-11 NOTE — ED PROVIDER NOTES
Encounter Date: 12/11/2019       History     Chief Complaint   Patient presents with    Ears Stopped Up     both ears stopped up, no pain      This is a 39-year-old male with no known significant past medical history who presents to the ED with a chief complaint of cerumen impaction.  Patient reports a history of cerumen impaction and is currently reporting a 2 day history of excess cerumen to both ears.  He reports decreased hearing.  Patient denies headache, fever, URI symptoms, ear pain, ear drainage, neck pain or stiffness. No trauma.        Review of patient's allergies indicates:  No Known Allergies  Past Medical History:   Diagnosis Date    Seizure      Past Surgical History:   Procedure Laterality Date    OPEN REDUCTION AND INTERNAL FIXATION (ORIF) OF FRACTURE OF CALCANEUS Left 4/18/2019    Procedure: ORIF, FRACTURE, CALCANEUS fracture dislocation;  Surgeon: Ander oMoney MD;  Location: [address];  Service: Orthopedics;  Laterality: Left;    OPEN REDUCTION AND INTERNAL FIXATION (ORIF) OF INJURY OF ANKLE Left 4/25/2019    Procedure: ORIF, ANKLE LEFT;  Surgeon: Goldy Williamson MD;  Location: [facility] OR [address] [place] [address];  Service: Orthopedics;  Laterality: Left;     History reviewed. No pertinent family history.  Social History     Tobacco Use    Smoking status: Never Smoker    Smokeless tobacco: Never Used   Substance Use Topics    Alcohol use: No    Drug use: No     Review of Systems   Constitutional: Negative for chills and fever.   HENT: Positive for hearing loss. Negative for congestion, ear discharge, ear pain and sore throat.    Respiratory: Negative for shortness of breath.    Cardiovascular: Negative for chest pain.   Gastrointestinal: Negative for nausea and vomiting.   Genitourinary: Negative for dysuria.   Musculoskeletal: Negative for back pain.   Skin: Negative for rash.   Neurological: Negative for weakness.   Hematological: Does not bruise/bleed easily.       Physical Exam     Initial  Vitals [12/11/19 1006]   BP Pulse Resp Temp SpO2   131/63 68 18 97.3 °F (36.3 °C) 100 %      MAP       --         Physical Exam    Constitutional: He appears well-developed and well-nourished. No distress.   HENT:   Head: Atraumatic.   Bilateral cerumen impactions.   Eyes: Conjunctivae and EOM are normal. Pupils are equal, round, and reactive to light.   Cardiovascular: Normal rate, regular rhythm and normal heart sounds.   Pulmonary/Chest: Breath sounds normal. No respiratory distress. He has no wheezes. He has no rhonchi. He has no rales.   Abdominal: Soft. Bowel sounds are normal. There is no tenderness.   Neurological: He is alert and oriented to person, place, and time.   Skin: Skin is warm and dry. No rash noted.         ED Course   Ear Wax Removal  Date/Time: 12/11/2019 12:18 PM  Performed by: Juana Zapata PA-C  Authorized by: Mindi Mcclain MD   Location details: both ears  Procedure type: curette Cerumen Removal Results: Cerumen partially removed.  Patient tolerance: Patient tolerated the procedure well with no immediate complications  Comments: A curette was utilized to remove cerumen from bilateral ears.  The external ear canals were then irrigated with warm water and hydrogen peroxide.  The patient noted significant improvement in his symptoms. He retains a small amount of cerumen bilaterally but the TMs appeared normal without signs of infection.        Labs Reviewed - No data to display       Imaging Results    None          Medical Decision Making:   History:   Old Medical Records: I decided to obtain old medical records.  Old Records Summarized: records from clinic visits.       APC / Resident Notes:   39-year-old male presenting with cerumen impaction.  On exam he is afebrile and nontoxic.  He has bilateral cerumen impactions that were addressed to the bedside, as detailed in the procedure note.  The patient noted symptomatic improvement and his hearing improved following the  procedure.  He was prescribed Debrox drops and advised to call and schedule a follow-up appointment with ENT.  He is stable for discharge.                            Clinical Impression:       ICD-10-CM ICD-9-CM   1. Bilateral impacted cerumen H61.23 380.4         Disposition:   Disposition: Discharged  Condition: Stable                     Juana Zapata PA-C  12/11/19 [id number]

## 2019-12-12 ENCOUNTER — PATIENT OUTREACH (OUTPATIENT)
Dept: EMERGENCY MEDICINE | Facility: HOSPITAL | Age: 39
End: 2019-12-12

## 2019-12-12 NOTE — PROGRESS NOTES
Lali Rai  ED Navigator  Emergency Department    [place] [facility] [place]  Role: Community Health Worker    Date: 12/12/2019  Patient Name: Charo Padron  MRN: [id number]  PCP: Manuela Sousa MD    Assessment:     Charo Padron is a 39 y.o. male who has presented to ED for No chief complaint on file.  . Patient has visited the ED 2 times in the past 3 months. Patient did not contact PCP.     ED Navigator Patient Assessment    Consent to Services  Does patient consent to completing the assessment?:  Yes  Transportation  Does the patient have issues with Transportation?:  No  Insurance Coverage  Do you have coverage/adequate coverage?:  Yes  Type/kind of coverage:  Medicaid  Is patient able to afford co-pays/deductibles?:  Yes  Is patient able to afford HME or supplies?:  Yes  Does patient have an established [facility] PCP?:  No  Does patient need assistance finding a PCP?:  No (Comment: PCP with [facility])  Does the patient have a lack of adequate coverage?:  No  Specialist Appointment  Did the patient come to the ED to see a specialist?:  No  Does the patient have a pending specialist referral?:  No  Does the patient have a specialist appointment made?:  No  PCP Follow Up Appointment  Does the patient have a follow up appontment with their PCP?:  No  Why does the patient not have a follow up scheduled?:  No established [facility]/outside PCP  Would you like an [facility] PCP?:  No  Medications  Is patient able to afford medication?:  Yes  Is patient unable to get medication due to lack of transportation?:  No  Psychological  Does the patient have psycho-social concerns?:  No  Food  Does the patient have concerns about food?:  No  Communication/Education  Does the patient have limited English proficiency/English not primary language?:  No  Does patient have low literacy and/or low health literacy?:  No  Does patient have concerns with care?:  No  Does patient have dissatisfaction with care?:   No  Other Financial Concers  Does the patient have immediate financial distress?:  No  Does the patient have general financial concerns?:  No  Other Social Barriers/Concerns  Does the patient have any additional barriers or concerns?:  Other (see comments), Unable to afford utilities (Comment: Patient states he cannot work due to his ankle injury)         Social History     Socioeconomic History    Marital status:      Spouse name: Not on file    Number of children: Not on file    Years of education: Not on file    Highest education level: Not on file   Occupational History    Not on file   Social Needs    Financial resource strain: Somewhat hard    Food insecurity:     Worry: Patient refused     Inability: Patient refused    Transportation needs:     Medical: Patient refused     Non-medical: Patient refused   Tobacco Use    Smoking status: Never Smoker    Smokeless tobacco: Never Used   Substance and Sexual Activity    Alcohol use: No    Drug use: No    Sexual activity: Yes     Partners: Female   Lifestyle    Physical activity:     Days per week: Patient refused     Minutes per session: Patient refused    Stress: Only a little   Relationships    Social connections:     Talks on phone: More than three times a week     Gets together: More than three times a week     Attends [employer] service: Never     Active member of club or organization: No     Attends meetings of clubs or organizations: Never     Relationship status: Not on file   Other Topics Concern    Not on file   Social History Narrative    Not on file       Plan:     Patient states he needs utility assistance resources.    Mailed utility assistance resources for his region. Mailed information for: [facility] 24/7 Nurse triage line, .op    Appointment made with: No appt made

## 2019-12-26 ENCOUNTER — DOCUMENTATION ONLY (OUTPATIENT)
Dept: REHABILITATION | Facility: HOSPITAL | Age: 39
End: 2019-12-26

## 2019-12-26 DIAGNOSIS — M25.673 DECREASED RANGE OF MOTION OF ANKLE, UNSPECIFIED LATERALITY: ICD-10-CM

## 2019-12-26 DIAGNOSIS — R26.9 GAIT ABNORMALITY: ICD-10-CM

## 2019-12-26 DIAGNOSIS — M25.572 ACUTE LEFT ANKLE PAIN: ICD-10-CM

## 2019-12-26 DIAGNOSIS — R29.898 ANKLE WEAKNESS: ICD-10-CM
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
## 2020-03-26 ENCOUNTER — DOCUMENTATION ONLY (OUTPATIENT)
Dept: REHABILITATION | Facility: HOSPITAL | Age: 40
End: 2020-03-26

## 2020-03-26 NOTE — PROGRESS NOTES
Physical Therapy Discharge Summary    Name: Charo Decker [facility] Number: [id number]  Diagnosis:   S82.202A,S82.402A (ICD-10-CM) - Closed fracture of left tibia and fibula, initial encounter   S02.2XXA (ICD-10-CM) - Closed fracture of nasal bone, initial encounter   S82.842A (ICD-10-CM) - Closed bimalleolar fracture of left ankle, initial encounter   S92.002A (ICD-10-CM) - Closed displaced fracture of left calcaneus, unspecified portion of calcaneus, initial        Physician: Ander Mooney MD  Treatment Orders: PT Eval and Treat  Past Medical History:   Diagnosis Date    Seizure      Initial visit: 5/3/2019  Date of Last visit: 5/3/2019  Date of Discharge Note: 3/26/2020  Total Visits Received: 1  Missed Visits: none  ASSESSMENT   Status Towards Goals Met:   GOALS: Short Term Goals:  8 weeks  1.Report decreased L ankle pain  < / =  5/10  to increase tolerance for partial weight bearing tasks  2. Increase AROM of L ankle to 50% of R.   3. Pt to tolerate HEP to improve ROM and independence with ADL's  4. Pt will be able to ambulate community distances using LRAD.      Long Term Goals: 20 weeks  1.Report decreased L ankle pain  < / =  3/10  to increase tolerance for stair ambulation  2.Pt will have normal gait and stair ambulation.   3.Increase strength to 4+/5 for  L ankle  to increase tolerance for ADL and work activities.  4. Pt will report at 56% limitation on FOTO to demonstrate increase in LE functional mobility.   5. Pt will have 80% AROM of L ankle compared to R ankle.   ?  Goals Not achieved and why: pt was not yet appropriate for PT services due to cast being on LE.   Discharge reason : continued PT at later date.   PLAN   This patient is discharged from Physical Therapy Services.   ?  Therapist: Issa Wasserman, PT

## 2020-07-15 ENCOUNTER — HOSPITAL ENCOUNTER (EMERGENCY)
Facility: HOSPITAL | Age: 40
Discharge: HOME | End: 2020-07-15
Attending: EMERGENCY MEDICINE
Payer: MEDICAID

## 2020-07-15 VITALS
DIASTOLIC BLOOD PRESSURE: 82 MMHG | RESPIRATION RATE: 15 BRPM | HEART RATE: 80 BPM | TEMPERATURE: 98 F | BODY MASS INDEX: 21.66 KG/M2 | HEIGHT: 65 IN | WEIGHT: 130 LBS | SYSTOLIC BLOOD PRESSURE: 129 MMHG | OXYGEN SATURATION: 99 %

## 2020-07-15 VITALS
HEIGHT: 65 IN | RESPIRATION RATE: 16 BRPM | HEART RATE: 77 BPM | BODY MASS INDEX: 21.66 KG/M2 | DIASTOLIC BLOOD PRESSURE: 73 MMHG | OXYGEN SATURATION: 98 % | WEIGHT: 130 LBS | SYSTOLIC BLOOD PRESSURE: 124 MMHG | TEMPERATURE: 98 F

## 2020-07-15 DIAGNOSIS — H60.502 ACUTE OTITIS EXTERNA OF LEFT EAR, UNSPECIFIED TYPE: ICD-10-CM

## 2020-07-15 DIAGNOSIS — H61.23 BILATERAL IMPACTED CERUMEN: Primary | ICD-10-CM

## 2020-07-15 PROCEDURE — 99283 EMERGENCY DEPT VISIT LOW MDM: CPT | Mod: 25,27

## 2020-07-15 PROCEDURE — 99283 EMERGENCY DEPT VISIT LOW MDM: CPT | Mod: 25

## 2020-07-15 PROCEDURE — 69209 REMOVE IMPACTED EAR WAX UNI: CPT | Mod: 50

## 2020-07-15 PROCEDURE — 99284 PR EMERGENCY DEPT VISIT,LEVEL IV: ICD-10-PCS | Mod: 25,,, | Performed by: PHYSICIAN ASSISTANT

## 2020-07-15 PROCEDURE — 69209 PR REMOVAL IMPACTED CERUMEN USING IRRIGATION/LAVAGE, UNILATERAL: ICD-10-PCS | Mod: 50,,, | Performed by: PHYSICIAN ASSISTANT

## 2020-07-15 PROCEDURE — 99284 EMERGENCY DEPT VISIT MOD MDM: CPT | Mod: 25,,, | Performed by: PHYSICIAN ASSISTANT

## 2020-07-15 PROCEDURE — 69209 REMOVE IMPACTED EAR WAX UNI: CPT | Mod: 50,,, | Performed by: PHYSICIAN ASSISTANT

## 2020-07-15 RX ORDER — CIPROFLOXACIN AND DEXAMETHASONE 3; 1 MG/ML; MG/ML
4 SUSPENSION/ DROPS AURICULAR (OTIC) 2 TIMES DAILY
Qty: 7.5 ML | Refills: 0 | Status: SHIPPED | OUTPATIENT
Start: 2020-07-15 | End: 2020-09-04

## 2020-07-16 NOTE — ED PROVIDER NOTES
Encounter Date: 7/15/2020       History     Chief Complaint   Patient presents with    Ear Fullness     Pt presents stating both of his ears have felt clogged since yesterday. Denies pain     39-year-old male presents to the emergency department for bilateral ear fullness and mild pain.  He reports that the symptoms began gradually yesterday and have been constant since onset.  He reports that he was evaluated at [facility] this afternoon and an attempt was made to irrigate his left ear.  He reports that since that time he has had some mild pain in that ear.  He denies any drainage from the ears.  He denies any fever, headache, dizziness, vision changes, tinnitus, neck pain, chest pain, sore throat, difficulty swelling, neck pain, chest pain, abdominal pain, nausea, vomiting or diarrhea.  No treatment was attempted prior to arrival.        Review of patient's allergies indicates:  No Known Allergies  Past Medical History:   Diagnosis Date    Seizure      Past Surgical History:   Procedure Laterality Date    OPEN REDUCTION AND INTERNAL FIXATION (ORIF) OF FRACTURE OF CALCANEUS Left 4/18/2019    Procedure: ORIF, FRACTURE, CALCANEUS fracture dislocation;  Surgeon: Ander Mooney MD;  Location: [address];  Service: Orthopedics;  Laterality: Left;    OPEN REDUCTION AND INTERNAL FIXATION (ORIF) OF INJURY OF ANKLE Left 4/25/2019    Procedure: ORIF, ANKLE LEFT;  Surgeon: Goldy Williamson MD;  Location: [address];  Service: Orthopedics;  Laterality: Left;     No family history on file.  Social History     Tobacco Use    Smoking status: Never Smoker    Smokeless tobacco: Never Used   Substance Use Topics    Alcohol use: No    Drug use: No     Review of Systems   Constitutional: Negative for activity change, chills and fever.   HENT: Positive for ear pain. Negative for congestion, ear discharge, facial swelling, rhinorrhea, sinus pressure, sore throat, trouble swallowing and voice change.     Eyes: Negative for photophobia and visual disturbance.   Respiratory: Negative for cough, chest tightness, shortness of breath and wheezing.    Cardiovascular: Negative for chest pain.   Gastrointestinal: Negative for abdominal pain, constipation, diarrhea, nausea and vomiting.   Genitourinary: Negative for decreased urine volume, dysuria, flank pain and hematuria.   Musculoskeletal: Negative for back pain, joint swelling, neck pain and neck stiffness.   Skin: Negative for rash.   Neurological: Negative for dizziness, syncope, weakness, light-headedness, numbness and headaches.   Hematological: Does not bruise/bleed easily.       Physical Exam     Initial Vitals [07/15/20 2106]   BP Pulse Resp Temp SpO2   129/82 80 15 97.8 °F (36.6 °C) 99 %      MAP       --         Physical Exam    Nursing note and vitals reviewed.  Constitutional: He appears well-developed and well-nourished. He is not diaphoretic. No distress.   HENT:   Head: Normocephalic and atraumatic.   Right Ear: Tympanic membrane, external ear and ear canal normal.   Left Ear: Tympanic membrane, external ear and ear canal normal. There is tenderness. No swelling. Tympanic membrane is not perforated, not erythematous, not retracted and not bulging.   Mouth/Throat: Oropharynx is clear and moist. No oropharyngeal exudate.   Eyes: Conjunctivae and EOM are normal. Pupils are equal, round, and reactive to light.   Neck: Normal range of motion. Neck supple.   Cardiovascular: Normal rate, regular rhythm and normal heart sounds. Exam reveals no gallop and no friction rub.    No murmur heard.  Pulmonary/Chest: Breath sounds normal. No respiratory distress. He has no wheezes. He has no rhonchi. He has no rales. He exhibits no tenderness.   Abdominal: Soft. Bowel sounds are normal. He exhibits no distension. There is no abdominal tenderness. There is no guarding.   Lymphadenopathy:     He has no cervical adenopathy.   Neurological: He is alert and oriented to person,  place, and time.   Skin: Skin is warm and dry.   Psychiatric: He has a normal mood and affect.         ED Course   Procedures  Labs Reviewed - No data to display       Imaging Results    None          Medical Decision Making:   Initial Assessment:   39-year-old male presents to the emergency department for evaluation of bilateral ear fullness and left-sided ear pain.  Physical exam reveals a nontoxic-appearing male in no acute distress.  Patient is afebrile vital signs within normal limits.  Neurological exam reveals an alert and oriented patient.  No tenderness to palpation noted over the pinna, tragus or mastoids bilaterally.  Mild erythema and tenderness noted to the external auditory canal.  Scant wax noted in the external auditory canals bilaterally.  TMs reveal no erythema, bulging or drainage.  Neck is supple, no meningeal signs noted.  Lungs clear to auscultation bilaterally.  No respiratory distress or accessory muscle use noted.  Differential Diagnosis:   Possible early Left-sided otitis externa  No evidence of mastoiditis or TM rupture  ED Management:  Discussed these findings at length with the patient verbalizes understanding and agreement course of treatment.  Will prescribed Ciprodex for continued symptom control.  Instructed patient to follow up with his primary care provider for re-evaluation and to return to the emergency department immediately for any new or worsening symptoms.                                  Clinical Impression:       ICD-10-CM ICD-9-CM   1. Bilateral impacted cerumen  H61.23 380.4   2. Acute otitis externa of left ear, unspecified type  H60.502 380.10             ED Disposition Condition    Discharge Stable        ED Prescriptions     Medication Sig Dispense Start Date End Date Auth. Provider    ciprofloxacin-dexamethasone 0.3-0.1% (CIPRODEX) 0.3-0.1 % DrpS Place 4 drops into both ears 2 (two) times daily. 7.5 mL 7/15/2020  Brandie Carvalho PA-C        Follow-up  Information     Follow up With Specialties Details Why Contact Info    Manuela Sousa MD Internal Medicine In 3 days  [address]  [place] [address]  [phone]      Manuela Sousa MD Internal Medicine In 3 days  [address]  [place] [address]  [phone]                                       Brandie Carvalho PA-C  07/15/20 3043     No

## 2020-07-16 NOTE — ED TRIAGE NOTES
"Patient presents to the ED with "fullness" to his left ear after getting water in his ear after a bath. Denies Ringing or pain in the ear, denies fever and sinus congestion   "

## 2020-07-16 NOTE — DISCHARGE INSTRUCTIONS
You are instructed to follow-up with  primary care provider for re-evaluation within 3 days.  You are instructed to return to the emergency department immediately for any new or worsening symptoms.

## 2020-07-16 NOTE — ED NOTES
Pt presents to the ED with c/o feeling fullness in both ears. Pt states he was also seen today at [facility] for the same complaint and had his ears irrigated. Pt reports no relief from irrigation. Pt denies pain/fever/drainage.     Patient identifiers verified and correct for Charo Padron.    LOC: The patient is awake, alert and aware of environment with an appropriate affect, the patient is oriented x 3 and speaking appropriately.  APPEARANCE: Patient resting comfortably and in no acute distress, patient is clean and well groomed, patient's clothing is properly fastened.  SKIN: The skin is warm and dry, color consistent with ethnicity  MUSCULOSKELETAL: Patient moving all extremities spontaneously, no obvious swelling or deformities noted.  RESPIRATORY: Airway is open and patent, respirations are spontaneous, patient has a normal effort and rate, no accessory muscle use noted  CARDIAC: Patient has a normal rate.  NEUROLOGIC:eyes open spontaneously, behavior appropriate to situation, follows commands, facial expression symmetrical, purposeful motor response noted  HEENT: +bilateral ear fullness/decreased hearing. Denies pain, drainage

## 2020-07-16 NOTE — ED PROVIDER NOTES
"Encounter Date: 7/15/2020       History     Chief Complaint   Patient presents with    Ear Fullness     Pt presents stating both of his ears have felt clogged since yesterday. Denies pain     The patient presents to the ER c/o bilateral ear fullness. He states "my ears are stopped up again". He states that about 6 months ago he had similar ear discomfort and came to the ER. He states that the nurse flushed the wax build up from his ear canals and he was hoping to have the same thing done this evening. He states that his hearing sounds muffled in both ears. He denies any otalgia. He denies any dizziness/vertigo. He denies any pre-arrival treatment. He states that he never filled the Rx for Debrox ear solution. He denies any additional concerns. He does not suspect Covid and denies any known exposure to anyone positive for Covid.            Review of patient's allergies indicates:  No Known Allergies  Past Medical History:   Diagnosis Date    Seizure      Past Surgical History:   Procedure Laterality Date    OPEN REDUCTION AND INTERNAL FIXATION (ORIF) OF FRACTURE OF CALCANEUS Left 4/18/2019    Procedure: ORIF, FRACTURE, CALCANEUS fracture dislocation;  Surgeon: Ander Mooney MD;  Location: [address];  Service: Orthopedics;  Laterality: Left;    OPEN REDUCTION AND INTERNAL FIXATION (ORIF) OF INJURY OF ANKLE Left 4/25/2019    Procedure: ORIF, ANKLE LEFT;  Surgeon: Goldy Williamson MD;  Location: [address];  Service: Orthopedics;  Laterality: Left;     No family history on file.  Social History     Tobacco Use    Smoking status: Never Smoker    Smokeless tobacco: Never Used   Substance Use Topics    Alcohol use: No    Drug use: No     Review of Systems   Constitutional: Negative for chills and fever.   HENT: Positive for hearing loss. Negative for congestion, ear discharge, ear pain, facial swelling, postnasal drip, rhinorrhea, sinus pressure, sinus pain, sneezing, sore throat, tinnitus, " trouble swallowing and voice change.    Eyes: Negative for discharge and redness.   Respiratory: Negative for cough, chest tightness, shortness of breath and wheezing.    Cardiovascular: Negative for chest pain.   Gastrointestinal: Negative for abdominal pain, diarrhea, nausea and vomiting.   Genitourinary: Negative for decreased urine volume.   Musculoskeletal: Negative for arthralgias and myalgias.   Skin: Negative for color change and rash.   Allergic/Immunologic: Negative for immunocompromised state.   Neurological: Negative for dizziness, syncope, weakness, light-headedness and headaches.   Psychiatric/Behavioral: Negative for confusion.       Physical Exam     Initial Vitals [07/15/20 2106]   BP Pulse Resp Temp SpO2   129/82 80 15 97.8 °F (36.6 °C) 99 %      MAP       --         Physical Exam    Nursing note and vitals reviewed.  Constitutional: He appears well-developed and well-nourished. He is not diaphoretic.   HENT:   Head: Normocephalic.   Mouth/Throat: Oropharynx is clear and moist.   Bilateral cerumen impaction. Ears non-tender to speculum exam.     Eyes: Conjunctivae are normal.   Neck: Normal range of motion. Neck supple.   Cardiovascular: Normal rate.   Pulmonary/Chest: No respiratory distress.   Abdominal: Soft.   Musculoskeletal: Normal range of motion.   Neurological: He is alert and oriented to person, place, and time. He has normal strength. No sensory deficit.   Skin: Skin is warm and dry. No rash and no abscess noted. No erythema.   Psychiatric: He has a normal mood and affect. His behavior is normal.         ED Course   Ear Wax Removal    Date/Time: 7/15/2020 9:09 PM  Performed by: Corey Pereira PA-C  Authorized by: Corey Pereira PA-C     Anesthesia:  Local Anesthetic: none  Location details: both ears  Procedure type: irrigation Cerumen Removal Results: Cerumen completely removed.  Patient tolerance: Patient tolerated the procedure well with no immediate  complications        Labs Reviewed - No data to display       Imaging Results    None          Medical Decision Making:   History:   Old Medical Records: I decided to obtain old medical records.  Initial Assessment:   Pt here c/o bilateral ear fullness and muffled hearing, gradual onset, similar to previous cerumen impactions.   Differential Diagnosis:   Abundant cerumen, URI, Sinusitis, Otitis, Labyrinthitis, Tinnitus, etc   ED Management:  Pt reports feeling better following ear irrigation   Rx for Debrox provided   Pt advised to follow up with primary care in the next 2-3 days for re-evaluation and further management   Pt advised to return to the ER if worse in any way                                  Clinical Impression:       ICD-10-CM ICD-9-CM   1. Bilateral impacted cerumen  H61.23 380.4         Disposition:   Disposition: Discharged  Condition: Stable     ED Disposition Condition    Discharge Stable        ED Prescriptions     None        Follow-up Information    None                                    Corey Pereira PA-C  07/15/20 2441

## 2020-07-31 NOTE — ED PROVIDER NOTES
Encounter Date: 7/15/2020       History     Chief Complaint   Patient presents with    Ear Fullness     left ear     HPI  Review of patient's allergies indicates:  No Known Allergies  Past Medical History:   Diagnosis Date    Seizure      Past Surgical History:   Procedure Laterality Date    OPEN REDUCTION AND INTERNAL FIXATION (ORIF) OF FRACTURE OF CALCANEUS Left 4/18/2019    Procedure: ORIF, FRACTURE, CALCANEUS fracture dislocation;  Surgeon: Ander Mooney MD;  Location: [address];  Service: Orthopedics;  Laterality: Left;    OPEN REDUCTION AND INTERNAL FIXATION (ORIF) OF INJURY OF ANKLE Left 4/25/2019    Procedure: ORIF, ANKLE LEFT;  Surgeon: Goldy Williamson MD;  Location: [facility] OR [address] [place] [address];  Service: Orthopedics;  Laterality: Left;     History reviewed. No pertinent family history.  Social History     Tobacco Use    Smoking status: Never Smoker    Smokeless tobacco: Never Used   Substance Use Topics    Alcohol use: No    Drug use: No     Review of Systems    Physical Exam     Initial Vitals [07/15/20 1849]   BP Pulse Resp Temp SpO2   124/73 77 16 98.3 °F (36.8 °C) 98 %      MAP       --         Physical Exam    ED Course   Procedures  Labs Reviewed - No data to display       Imaging Results    None                                          Clinical Impression:       ICD-10-CM ICD-9-CM   1. Bilateral impacted cerumen  H61.23 380.4             ED Disposition Condition    Discharge Stable        ED Prescriptions     Medication Sig Dispense Start Date End Date Auth. Provider    carbamide peroxide (DEBROX) 6.5 % otic solution Place 5 drops into both ears as needed (earwax). 15 mL 7/15/2020  Corey Pereira PA-C        Follow-up Information     Follow up With Specialties Details Why Contact Info    Manuela Sousa MD Internal Medicine Schedule an appointment as soon as possible for a visit in 2 days  [address]  [place] [address]  [phone]      [facility]  Emergency Medicine  If symptoms worsen in any way [address]  [facility] [id number]  [phone]                                     Colten Pierson III, MD  09/16/20 0030

## 2020-09-04 ENCOUNTER — HOSPITAL ENCOUNTER (EMERGENCY)
Facility: HOSPITAL | Age: 40
Discharge: HOME | End: 2020-09-04
Attending: EMERGENCY MEDICINE
Payer: MEDICAID

## 2020-09-04 VITALS
HEIGHT: 65 IN | WEIGHT: 140 LBS | DIASTOLIC BLOOD PRESSURE: 70 MMHG | TEMPERATURE: 98 F | RESPIRATION RATE: 18 BRPM | SYSTOLIC BLOOD PRESSURE: 131 MMHG | BODY MASS INDEX: 23.32 KG/M2 | OXYGEN SATURATION: 99 % | HEART RATE: 76 BPM

## 2020-09-04 DIAGNOSIS — R11.0 NAUSEA: ICD-10-CM

## 2020-09-04 LAB
ALBUMIN SERPL BCP-MCNC: 4.2 G/DL (ref 3.5–5.2)
ALP SERPL-CCNC: 98 U/L (ref 55–135)
ALT SERPL W/O P-5'-P-CCNC: 31 U/L (ref 10–44)
AMPHET+METHAMPHET UR QL: NEGATIVE
ANION GAP SERPL CALC-SCNC: 6 MMOL/L (ref 8–16)
AST SERPL-CCNC: 23 U/L (ref 10–40)
BARBITURATES UR QL SCN>200 NG/ML: NEGATIVE
BASOPHILS # BLD AUTO: 0.03 K/UL (ref 0–0.2)
BASOPHILS NFR BLD: 0.2 % (ref 0–1.9)
BENZODIAZ UR QL SCN>200 NG/ML: NEGATIVE
BILIRUB SERPL-MCNC: 0.4 MG/DL (ref 0.1–1)
BILIRUB UR QL STRIP: NEGATIVE
BUN SERPL-MCNC: 12 MG/DL (ref 6–20)
BZE UR QL SCN: NEGATIVE
CALCIUM SERPL-MCNC: 9.4 MG/DL (ref 8.7–10.5)
CANNABINOIDS UR QL SCN: NEGATIVE
CHLORIDE SERPL-SCNC: 102 MMOL/L (ref 95–110)
CLARITY UR REFRACT.AUTO: CLEAR
CO2 SERPL-SCNC: 29 MMOL/L (ref 23–29)
COLOR UR AUTO: YELLOW
CREAT SERPL-MCNC: 0.8 MG/DL (ref 0.5–1.4)
CREAT UR-MCNC: 78 MG/DL (ref 23–375)
DIFFERENTIAL METHOD: ABNORMAL
EOSINOPHIL # BLD AUTO: 0.1 K/UL (ref 0–0.5)
EOSINOPHIL NFR BLD: 1 % (ref 0–8)
ERYTHROCYTE [DISTWIDTH] IN BLOOD BY AUTOMATED COUNT: 13 % (ref 11.5–14.5)
EST. GFR  (AFRICAN AMERICAN): >60 ML/MIN/1.73 M^2
EST. GFR  (NON AFRICAN AMERICAN): >60 ML/MIN/1.73 M^2
ETHANOL UR-MCNC: <10 MG/DL
GLUCOSE SERPL-MCNC: 93 MG/DL (ref 70–110)
GLUCOSE UR QL STRIP: NEGATIVE
HCT VFR BLD AUTO: 40.1 % (ref 40–54)
HGB BLD-MCNC: 12.6 G/DL (ref 14–18)
HGB UR QL STRIP: NEGATIVE
IMM GRANULOCYTES # BLD AUTO: 0.05 K/UL (ref 0–0.04)
IMM GRANULOCYTES NFR BLD AUTO: 0.4 % (ref 0–0.5)
KETONES UR QL STRIP: NEGATIVE
LACTATE SERPL-SCNC: 0.7 MMOL/L (ref 0.5–2.2)
LEUKOCYTE ESTERASE UR QL STRIP: NEGATIVE
LIPASE SERPL-CCNC: 12 U/L (ref 4–60)
LYMPHOCYTES # BLD AUTO: 1.5 K/UL (ref 1–4.8)
LYMPHOCYTES NFR BLD: 11.8 % (ref 18–48)
MAGNESIUM SERPL-MCNC: 1.8 MG/DL (ref 1.6–2.6)
MCH RBC QN AUTO: 28.6 PG (ref 27–31)
MCHC RBC AUTO-ENTMCNC: 31.4 G/DL (ref 32–36)
MCV RBC AUTO: 91 FL (ref 82–98)
METHADONE UR QL SCN>300 NG/ML: NEGATIVE
MONOCYTES # BLD AUTO: 0.7 K/UL (ref 0.3–1)
MONOCYTES NFR BLD: 5.3 % (ref 4–15)
NEUTROPHILS # BLD AUTO: 10.3 K/UL (ref 1.8–7.7)
NEUTROPHILS NFR BLD: 81.3 % (ref 38–73)
NITRITE UR QL STRIP: NEGATIVE
NRBC BLD-RTO: 0 /100 WBC
OPIATES UR QL SCN: NEGATIVE
PCP UR QL SCN>25 NG/ML: NEGATIVE
PH UR STRIP: 6 [PH] (ref 5–8)
PHOSPHATE SERPL-MCNC: 3.2 MG/DL (ref 2.7–4.5)
PLATELET # BLD AUTO: 204 K/UL (ref 150–350)
PMV BLD AUTO: 11.9 FL (ref 9.2–12.9)
POTASSIUM SERPL-SCNC: 4 MMOL/L (ref 3.5–5.1)
PROT SERPL-MCNC: 7.7 G/DL (ref 6–8.4)
PROT UR QL STRIP: NEGATIVE
RBC # BLD AUTO: 4.41 M/UL (ref 4.6–6.2)
SARS-COV-2 RDRP RESP QL NAA+PROBE: NEGATIVE
SODIUM SERPL-SCNC: 137 MMOL/L (ref 136–145)
SP GR UR STRIP: 1.01 (ref 1–1.03)
T4 FREE SERPL-MCNC: 0.96 NG/DL (ref 0.71–1.51)
TOXICOLOGY INFORMATION: NORMAL
TROPONIN I SERPL DL<=0.01 NG/ML-MCNC: <0.006 NG/ML (ref 0–0.03)
TROPONIN I SERPL DL<=0.01 NG/ML-MCNC: <0.006 NG/ML (ref 0–0.03)
TSH SERPL DL<=0.005 MIU/L-ACNC: 0.01 UIU/ML (ref 0.4–4)
URN SPEC COLLECT METH UR: NORMAL
WBC # BLD AUTO: 12.72 K/UL (ref 3.9–12.7)

## 2020-09-04 PROCEDURE — 84100 ASSAY OF PHOSPHORUS: CPT

## 2020-09-04 PROCEDURE — 80053 COMPREHEN METABOLIC PANEL: CPT

## 2020-09-04 PROCEDURE — 84484 ASSAY OF TROPONIN QUANT: CPT | Mod: 91

## 2020-09-04 PROCEDURE — 96374 THER/PROPH/DIAG INJ IV PUSH: CPT

## 2020-09-04 PROCEDURE — 84439 ASSAY OF FREE THYROXINE: CPT

## 2020-09-04 PROCEDURE — 63600175 PHARM REV CODE 636 W HCPCS: Performed by: EMERGENCY MEDICINE

## 2020-09-04 PROCEDURE — 99284 PR EMERGENCY DEPT VISIT,LEVEL IV: ICD-10-PCS | Mod: ,,, | Performed by: EMERGENCY MEDICINE

## 2020-09-04 PROCEDURE — 99284 EMERGENCY DEPT VISIT MOD MDM: CPT | Mod: ,,, | Performed by: EMERGENCY MEDICINE

## 2020-09-04 PROCEDURE — 80307 DRUG TEST PRSMV CHEM ANLYZR: CPT

## 2020-09-04 PROCEDURE — U0002 COVID-19 LAB TEST NON-CDC: HCPCS

## 2020-09-04 PROCEDURE — 99284 EMERGENCY DEPT VISIT MOD MDM: CPT | Mod: 25

## 2020-09-04 PROCEDURE — 85025 COMPLETE CBC W/AUTO DIFF WBC: CPT

## 2020-09-04 PROCEDURE — 83735 ASSAY OF MAGNESIUM: CPT

## 2020-09-04 PROCEDURE — 93010 EKG 12-LEAD: ICD-10-PCS | Mod: ,,, | Performed by: INTERNAL MEDICINE

## 2020-09-04 PROCEDURE — 93010 ELECTROCARDIOGRAM REPORT: CPT | Mod: ,,, | Performed by: INTERNAL MEDICINE

## 2020-09-04 PROCEDURE — 93005 ELECTROCARDIOGRAM TRACING: CPT

## 2020-09-04 PROCEDURE — 83690 ASSAY OF LIPASE: CPT

## 2020-09-04 PROCEDURE — 81003 URINALYSIS AUTO W/O SCOPE: CPT

## 2020-09-04 PROCEDURE — 83605 ASSAY OF LACTIC ACID: CPT

## 2020-09-04 PROCEDURE — 25000003 PHARM REV CODE 250: Performed by: EMERGENCY MEDICINE

## 2020-09-04 PROCEDURE — 84443 ASSAY THYROID STIM HORMONE: CPT

## 2020-09-04 RX ORDER — ONDANSETRON 2 MG/ML
4 INJECTION INTRAMUSCULAR; INTRAVENOUS
Status: COMPLETED | OUTPATIENT
Start: 2020-09-04 | End: 2020-09-04

## 2020-09-04 RX ADMIN — SODIUM CHLORIDE 1000 ML: 0.9 INJECTION, SOLUTION INTRAVENOUS at 06:09

## 2020-09-04 RX ADMIN — ONDANSETRON 4 MG: 2 INJECTION INTRAMUSCULAR; INTRAVENOUS at 06:09

## 2020-09-04 NOTE — ED PROVIDER NOTES
Encounter Date: 9/4/2020    SCRIBE #1 NOTE: I, Goldy Dasilva, am scribing for, and in the presence of,  Liu Curtis MD. I have scribed the following portions of the note - Other sections scribed: FLORA ROS.       History     Chief Complaint   Patient presents with    Clinton HINES   Mr. Padron is a 40 y.o. male with a past medical history of seizures who presents to the ED with nausea that began just prior to his arrival at the ED and one episode of vomiting. He states that this has not happened to him before. He endorses light sensitivity and lightheadedness. He has not taken any medication for his nausea or vomiting. He denies any pain, headaches, recent seizures, abdominal pain, diarrhea, contact with sick individuals, cough, chest pain, shortness of breath (besides while vomiting), runny nose, and changes in taste and smell. He was asleep when I came into the room and seems tired and lethargic which limited this history (he gave mostly one word responses to my questions). His last seizure was 2 years ago and he does not take any medication for his seizures. He denies any drug or alcohol use. He states that he has no known drug allergies.     The history is provided by the patient and medical records.    Review of patient's allergies indicates:  No Known Allergies  Past Medical History:   Diagnosis Date    Seizure      Past Surgical History:   Procedure Laterality Date    OPEN REDUCTION AND INTERNAL FIXATION (ORIF) OF FRACTURE OF CALCANEUS Left 4/18/2019    Procedure: ORIF, FRACTURE, CALCANEUS fracture dislocation;  Surgeon: Ander Mooney MD;  Location: [address];  Service: Orthopedics;  Laterality: Left;    OPEN REDUCTION AND INTERNAL FIXATION (ORIF) OF INJURY OF ANKLE Left 4/25/2019    Procedure: ORIF, ANKLE LEFT;  Surgeon: Goldy Williamson MD;  Location: [facility] OR [address] [place] [address];  Service: Orthopedics;  Laterality: Left;     History reviewed. No pertinent family history.  Social History     Tobacco  Use    Smoking status: Never Smoker    Smokeless tobacco: Never Used   Substance Use Topics    Alcohol use: No    Drug use: No     Review of Systems   Constitutional:  No Fever, No Chills, Positive for fatigue.  Eyes: No Vision Changes  ENT/Mouth: No sore throat, No rhinorrhea, No changes in taste or smell.  Cardiovascular:  No Chest Pain, No Palpitations  Respiratory:  No Cough, No SOB  Gastrointestinal:  Positive for Nausea, Positive for Vomiting, No Diarrhea, No abdo pain.  Genitourinary:  No  pain, No dysuria   Musculoskeletal:  No Arthralgias, No Back Pain, No Neck Pain, No recent trauma.  Skin:  No skin Lesions  Neuro:  No Weakness, No Numbness, No Paresthesias, No Dizziness, No Headache, Positive for light sensitivity and lightheadedness.     Physical Exam     Initial Vitals [09/04/20 1634]   BP Pulse Resp Temp SpO2   121/72 69 16 97.7 °F (36.5 °C) 97 %      MAP       --         Physical Exam    Vitals reviewed.        Physical Exam:  CONSTITUTIONAL: Well developed, well nourished, in no acute distress.  HENT: Normocephalic, atraumatic    EYES: Sclerae anicteric, pupils equal round reactive, extraocular motions are intact  NECK: Supple, no thyroid enlargement  CARDIOVASCULAR: Regular rate and rhythm without any murmurs, gallops, rubs.  RESPIRATORY: Speaking in full sentences. Breathing comfortably. Auscultation of the lungs revealed normal breath sounds b/l, no wheezing, no rales, no rhonchi.  ABDOMEN: Soft and nontender, no masses, no rebound or guarding   NEUROLOGIC: Alert, the lethargic appearing.  Able to answer all questions and follow commands.  However he appears very fatigued and has a very flat affect.  5/5 strength bilaterally upper extremities and lower extremities.  Normal finger-nose bilaterally.  No facial droop.   MSK: Moving all four extremities.  Skin: Warm and dry. No visible rash on exposed areas of skin.    Psych:  Flat affect.      ED Course   Procedures  Labs Reviewed   CBC W/ AUTO  DIFFERENTIAL - Abnormal; Notable for the following components:       Result Value    WBC 12.72 (*)     RBC 4.41 (*)     Hemoglobin 12.6 (*)     Mean Corpuscular Hemoglobin Conc 31.4 (*)     Gran # (ANC) 10.3 (*)     Immature Grans (Abs) 0.05 (*)     Gran% 81.3 (*)     Lymph% 11.8 (*)     All other components within normal limits   COMPREHENSIVE METABOLIC PANEL - Abnormal; Notable for the following components:    Anion Gap 6 (*)     All other components within normal limits   TSH - Abnormal; Notable for the following components:    TSH 0.015 (*)     All other components within normal limits   LACTIC ACID, PLASMA   LIPASE   MAGNESIUM   TROPONIN I   SARS-COV-2 RNA AMPLIFICATION, QUAL   URINALYSIS, REFLEX TO URINE CULTURE    Narrative:     Specimen Source->Urine   PHOSPHORUS   T4, FREE   TOXICOLOGY SCREEN, URINE, RANDOM (COMPLIANCE)    Narrative:     Specimen Source->Urine   TROPONIN I     EKG Readings: (Independently Interpreted)   Normal sinus rhythm at a rate of 64 with unremarkable intervals, no ischemic changes.       Imaging Results    None          Medical Decision Making:   History:   Old Medical Records: I decided to obtain old medical records.  Independently Interpreted Test(s):   I have ordered and independently interpreted EKG Reading(s) - see prior notes  Clinical Tests:   Lab Tests: Ordered and Reviewed  Medical Tests: Ordered and Reviewed    40-year-old male with past medical history of seizures reports noncompliance with seizure medications but no seizure in last 2 years, coming in with a 1 day of nausea and 1 episode of vomiting.  Denies all other symptoms including chest pain, shortness of breath, abdominal pain, diarrhea, headache, dizziness, fevers, cough.  Patient is not forthcoming with history.  He denies drugs and alcohol use.  Is exam is nonfocal including neurologic exam however he appears fatigued/flat of affect.  It is unclear if this is secondary to a metabolic process or patient is not  interested in participating in exam.    Abdomen is soft nontender, nonsurgical. No complaint of abdo pain, not consistent with dangerous abdo pathology at this time.     Obtain wife number will call for collateral information.    Does complain of some photophobia, but no fevers, no HA, no neck pain, no rash, not consistent with meningitis, or dangerous IC infection at this time.     In the meantime, will start with broad metabolic and hematologic workup to look for any abnormalities.  IV fluids and Zofran for symptom control.  EKG troponin giving vomiting with nausea and no other specific findings although I have a low suspicion for ACS.  Will obtain urine studies U tox as well.    Disposition based on patient's symptomatology and ED workup.    20:30 Labs including UA, lactate, lipase, troponin are unremarkable. TSH is low but Free T4 is normal. There is mild white count. Will go revaluate patient.     21:20 Patient is feeling improved. No new episodes of vomiting or nausea. Spoke to wife at bedside. Patient is at baseline mental status. Spoke to other provider who is familiar with patient from previous encounter. This is his baseline mental status, he is flat and standoffish at baseline.   Given his non-focal exam, wife stating he is at baseline, feeling improved, no HA, no focal neuro complaints not consistent with dangerous IC pathology at this time. This seems to be pt's baseline, and that he is not altered.     Will obtain second troponin and PO trial patient. Plan was also for abdo re-exam. If second troponin is negative and patient passes PO trial, will discharge with return precautions.     Update:   Tolerated water and crackers. Pt refusing to wait for result of second trop. Demanding to have IV taken out. IV taken out. I explained that I will have to AMA him, give his results are not complete, I started the process, however, pt eloped prior to me being able to AMA him.     MDM Complexity Points:   Problem  Points:  1.New problem, with no additional ED work-up planned (maximum of 1) -    Data Points:  Review or order clinical lab tests, Review or order medicine test (PFTs, EKG, cardiac echo or catheterization), Independent review of image, tracing, or specimen, Decision to obtain old records (in the EHR), Review and summarization of old records and Obtaining history from Someone else other than the patient.               Scribe Attestation:   Scribe #1: I performed the above scribed service and the documentation accurately describes the services I performed. I attest to the accuracy of the note.    Attending Attestation:           Physician Attestation for Scribe:  Physician Attestation Statement for Scribe #1: I, reviewed documentation, as scribed by Goldy Dasilva in my presence, and it is both accurate and complete.                               Clinical Impression:       ICD-10-CM ICD-9-CM   1. Nausea  R11.0 787.02         Disposition:   Disposition: Eloped     ED Disposition Condition    Eloped                           Liu Curtis MD  09/05/20 2210

## 2020-09-04 NOTE — ED NOTES
LOC: The patient is awake, alert, and oriented to place, time, situation. Affect is bizarre.  Speech is appropriate and clear.     APPEARANCE: Patient resting comfortably in no acute distress.  Patient is clean and well groomed.    SKIN: The skin is warm and dry; color consistent with ethnicity.  Patient has normal skin turgor and moist mucus membranes.  Skin intact; no breakdown or bruising noted.     MUSCULOSKELETAL: Patient moving upper and lower extremities without difficulty.  Denies weakness.     RESPIRATORY: Airway is open and patent. Respirations spontaneous, even, easy, and non-labored.  Patient has a normal effort and rate.  No accessory muscle use noted. Denies cough.     CARDIAC:  Normal rate noted.  No peripheral edema noted. No complaints of chest pain.      ABDOMEN: Soft and non tender to palpation.  No distention noted. Pt complains of vomiting.    NEUROLOGIC: Eyes open spontaneously.  Behavior appropriate to situation.  Follows commands; facial expression symmetrical.  Purposeful motor response noted; normal sensation in all extremities. Pt complains of lightheadedness.

## 2020-09-05 NOTE — ED NOTES
RN removed IV, catheter intact, per policy. RN reiterated risks and importance of lab and need to come back to ER if symptoms worsen. Pt and visitor rude and threatening. RN escorted pt and visitor to ER waiting room without confrontation.

## 2020-09-05 NOTE — ED NOTES
Pt and visitor requesting update on POC. RN informed pt and visitor that troponin lab has not yet resulted. Pt requesting IV be removed and that they would like to leave. RN expressed importance of lab and offered to bring MD to bedside.

## 2020-11-02 ENCOUNTER — TELEPHONE (OUTPATIENT)
Dept: ORTHOPEDICS | Facility: CLINIC | Age: 40
End: 2020-11-02

## 2020-11-02 NOTE — TELEPHONE ENCOUNTER
Spoke with pt, he is scheduled to come in tomorrow 11/3/2020 for 9am to see RR for a f/u with left ankle. Pt verbalize understands.

## 2020-11-03 ENCOUNTER — HOSPITAL ENCOUNTER (OUTPATIENT)
Dept: RADIOLOGY | Facility: HOSPITAL | Age: 40
Discharge: HOME | End: 2020-11-03
Attending: PHYSICIAN ASSISTANT
Payer: MEDICAID

## 2020-11-03 ENCOUNTER — CLINICAL SUPPORT (OUTPATIENT)
Dept: REHABILITATION | Facility: HOSPITAL | Age: 40
End: 2020-11-03
Payer: MEDICAID

## 2020-11-03 ENCOUNTER — OFFICE VISIT (OUTPATIENT)
Dept: ORTHOPEDICS | Facility: CLINIC | Age: 40
End: 2020-11-03
Payer: MEDICAID

## 2020-11-03 VITALS — HEIGHT: 65 IN | BODY MASS INDEX: 27.85 KG/M2 | WEIGHT: 167.13 LBS

## 2020-11-03 DIAGNOSIS — S92.002D CLOSED DISPLACED FRACTURE OF LEFT CALCANEUS WITH ROUTINE HEALING, UNSPECIFIED PORTION OF CALCANEUS, SUBSEQUENT ENCOUNTER: ICD-10-CM

## 2020-11-03 DIAGNOSIS — M19.072 DJD (DEGENERATIVE JOINT DISEASE), ANKLE AND FOOT, LEFT: ICD-10-CM

## 2020-11-03 DIAGNOSIS — S92.002D CLOSED DISPLACED FRACTURE OF LEFT CALCANEUS WITH ROUTINE HEALING, UNSPECIFIED PORTION OF CALCANEUS, SUBSEQUENT ENCOUNTER: Primary | ICD-10-CM

## 2020-11-03 DIAGNOSIS — G89.29 CHRONIC HEEL PAIN, LEFT: ICD-10-CM

## 2020-11-03 DIAGNOSIS — M25.572 ACUTE LEFT ANKLE PAIN: ICD-10-CM

## 2020-11-03 DIAGNOSIS — M79.672 CHRONIC HEEL PAIN, LEFT: ICD-10-CM

## 2020-11-03 DIAGNOSIS — M76.822 POSTERIOR TIBIAL TENDONITIS, LEFT: ICD-10-CM

## 2020-11-03 DIAGNOSIS — M76.72 PERONEAL TENDONITIS OF LEFT LOWER EXTREMITY: ICD-10-CM

## 2020-11-03 PROCEDURE — 73650 X-RAY EXAM OF HEEL: CPT | Mod: TC,LT

## 2020-11-03 PROCEDURE — 99999 PR PBB SHADOW E&M-EST. PATIENT-LVL III: ICD-10-PCS | Mod: PBBFAC,,, | Performed by: PHYSICIAN ASSISTANT

## 2020-11-03 PROCEDURE — 73610 X-RAY EXAM OF ANKLE: CPT | Mod: 26,LT,, | Performed by: RADIOLOGY

## 2020-11-03 PROCEDURE — 99213 OFFICE O/P EST LOW 20 MIN: CPT | Mod: PBBFAC,25 | Performed by: PHYSICIAN ASSISTANT

## 2020-11-03 PROCEDURE — 99999 PR PBB SHADOW E&M-EST. PATIENT-LVL III: CPT | Mod: PBBFAC,,, | Performed by: PHYSICIAN ASSISTANT

## 2020-11-03 PROCEDURE — 73650 XR CALCANEUS 2 VIEW LEFT: ICD-10-PCS | Mod: 26,LT,, | Performed by: RADIOLOGY

## 2020-11-03 PROCEDURE — 97110 THERAPEUTIC EXERCISES: CPT

## 2020-11-03 PROCEDURE — 73650 X-RAY EXAM OF HEEL: CPT | Mod: 26,LT,, | Performed by: RADIOLOGY

## 2020-11-03 PROCEDURE — 99213 PR OFFICE/OUTPT VISIT, EST, LEVL III, 20-29 MIN: ICD-10-PCS | Mod: S$PBB,,, | Performed by: PHYSICIAN ASSISTANT

## 2020-11-03 PROCEDURE — 73610 X-RAY EXAM OF ANKLE: CPT | Mod: TC,LT

## 2020-11-03 PROCEDURE — 73610 XR ANKLE COMPLETE 3 VIEW LEFT: ICD-10-PCS | Mod: 26,LT,, | Performed by: RADIOLOGY

## 2020-11-03 PROCEDURE — 97161 PT EVAL LOW COMPLEX 20 MIN: CPT

## 2020-11-03 PROCEDURE — 99213 OFFICE O/P EST LOW 20 MIN: CPT | Mod: S$PBB,,, | Performed by: PHYSICIAN ASSISTANT

## 2020-11-03 RX ORDER — MELOXICAM 15 MG/1
15 TABLET ORAL DAILY
Qty: 30 TABLET | Refills: 0 | Status: SHIPPED | OUTPATIENT
Start: 2020-11-03 | End: 2020-12-03

## 2020-11-03 NOTE — PROGRESS NOTES
Subjective:      Patient ID: Charo Padron is a 40 y.o. male.    Chief Complaint: Follow-up (left calcaneus/ankle)    HPI    Patient is a 40 year old male who presents to clinic with chief complaint of left ankle pain, stifness and swelling. He is well known to me due to previous ORIF of trimalleolar ankle fracture with syndesmosis fixation on 04/25/2020 by  and ORIF subtalar joint on 04/18/2020 with . He stated that he has daily swelling and achy throbbing pain in his ankle. He does reports some weakness, but denied feeling unstable.  Symptoms are increased with walking and weather changes. He is not taking any medication for this. He has an ankle brace but is not using it, though it does help. After our previous visit he was to attend formal PT, but stated that he maybe went 2 times then stopped due to transportation. He stated that he knows that I cannot given him narcotic pain medication, but requests a muscle relaxer.   He gil stated that the 2 most proximal screws of the lateral incision is bothering him. Had previously discussed removal of syndesmosis screws, but he was not interested in any further surgical intervention. He denied any fever, chills or night sweats.     Review of Systems   Constitution: Negative for chills and fever.   Cardiovascular: Negative for chest pain.   Respiratory: Negative for cough and shortness of breath.    Skin: Negative for color change, dry skin, itching, nail changes, poor wound healing and rash.   Musculoskeletal: Positive for joint pain and stiffness.        Left ankle pain   Neurological: Negative for dizziness.   Psychiatric/Behavioral: Negative for altered mental status. The patient is not nervous/anxious.    All other systems reviewed and are negative.        Objective:      General    Constitutional: He is oriented to person, place, and time. He appears well-developed and well-nourished. No distress.   HENT:   Head: Atraumatic.   Eyes:  Conjunctivae are normal.   Cardiovascular: Normal rate.    Pulmonary/Chest: Effort normal.   Neurological: He is alert and oriented to person, place, and time.   Psychiatric: He has a normal mood and affect. His behavior is normal.         Left Ankle/Foot Exam     Inspection  Bruising: Ankle - absent   Effusion: Ankle - absent   Scars: present    Tenderness   The patient is tender to palpation of the peroneals and posterior tibial tendon.    Range of Motion   Ankle Joint  Dorsiflexion: 20   Plantar flexion: 20     Subtalar Joint   Inversion: 0   Eversion: 0       Muscle Strength   Left Lower Extremity   Anterior tibial:  3/5   Gastrocsoleus:  5/5     RADS: reviewed by myself  ANKLE:   There is hardware stabilizing a healing bimalleolar fracture good alignment no complication.  There is also talocalcaneal arthrodesis screws. Fractured lower syndesmotic screw noted, new.  Alignment unchanged.    CALCANEOUS: Subtalar arthrodesis noted with 2 internal fixation screws.  The alignment is unchanged.  Fractured screw noted at the lower syndesmotic screw in the distal fibula.  No acute fractures.  Midfoot degenerative changes noted.        Assessment:       Encounter Diagnoses   Name Primary?    Closed displaced fracture of left calcaneus with routine healing, unspecified portion of calcaneus, subsequent encounter Yes    Acute left ankle pain     Posterior tibial tendonitis, left     Peroneal tendonitis of left lower extremity     DJD (degenerative joint disease), ankle and foot, left           Plan:       Discussed plan with the patient.   - RICE, ankle brace  - prescription written for Mobic x 2 weeks  -Order placed for PT/OT,[facility], Patient is responsible to establish and continue care   -range of motion as tolerated    - weight bearing as tolerated, again explained to the patient that he will have some motion deficit especially with inversion and eversion. He voiced understanding  - Appointment made with   in 6 weeks if continued pain/ discomfort of hardware.

## 2020-11-03 NOTE — PROGRESS NOTES
[facility]  Physical Therapy Initial Evaluation    Name: Charo Padron  Clinic Number: [id number]    Therapy Diagnosis:   Encounter Diagnosis   Name Primary?    Chronic heel pain, left      Physician: Fatmata Good PA-C    Physician Orders: PT Eval and Treat  Medical Diagnosis: Closed displaced fracture of left calcaneus with routine healing, unspecified portion of calcaneus, subsequent encounter   Evaluation Date: 11/3/2020  Authorization Period Expiration: 1/3/2021  Plan of Care Certification Period: 1/28/2021  Visit # / Visits authorized: 1/12  Date of Surgery: 4/18/2019  Precautions: Standard    Time In: 120p  Time Out: 205p  Total Billable Time: 45 minutes    Subjective     Date of onset: 4/18/2019  History of current condition - Charo reports: that he is feeling swelling and pain.  He states that he has been in and out of his boot since September of last year when he was weaning out of his boot in therapy.  He states that he was unable to continue therapy due to lack of a ride.  The patient states that he is limited in walking, standing, stairs.  He states that he feels more pain when it is cold or rainy, feels soreness and tenderness around his screws.  He states that he can't wear shoes often because of ankle swelling.  He states that he wants to be able to walk without a limp.       Past Medical History:   Diagnosis Date    Charlie Padron  has a past surgical history that includes Open reduction and internal fixation (ORIF) of fracture of calcaneus (Left, 4/18/2019) and Open reduction and internal fixation (ORIF) of injury of ankle (Left, 4/25/2019).    Charo has a current medication list which includes the following prescription(s): meloxicam, and the following Facility-Administered Medications: sodium chloride 0.9% and mupirocin.    Review of patient's allergies indicates:  No Known Allergies     Prior Therapy: Yes  Social History: Lives  "alone in a home  Occupation: Not currently employed  Prior Level of Function: No limitations  Current Level of Function: Limited in standing, walking, squatting, stairs    Pain:  Current 2/10, worst 6/10, best 1/10   Location: left ankles  Description: Aching, Dull, Throbbing, Tight and Deep    Pts goals: To walk without a limp    Objective     Range of Motion:   Ankle Right (Active/Passive) Left (Active/Passive)   Dorsiflexion 15 degrees 0/5 degrees   Plantarflexion 45 degrees 10/15 degrees   Inversion 20 degrees 5/10 degrees   Eversion 5 degrees 0 degrees      Strength:  Ankle Right Left   Dorsiflexion 5/5 4/5   Plantarflexion 5/5 3/5   Inversion 5/5 4/5   Eversion 5/5 4/5       CMS Impairment/Limitation/Restriction for FOTO Ankle Survey    Therapist reviewed FOTO scores for Charo Padron on 11/3/2020.   FOTO documents entered into [employer] - see Media section.    Limitation Score: 58%  Category: Mobility    Current : CK = at least 40% but < 60% impaired, limited or restricted  Goal: CJ = at least 20% but < 40% impaired, limited or restricted  Discharge:          TREATMENT     Treatment Time In: 135p  Treatment Time Out: 205p  Total Treatment time separate from Evaluation time:30 minutes    Charo received therapeutic exercises to develop strength, endurance, ROM, flexibility and core stabilization for 30 minutes including:  Education in diagnosis and plan of care  Self plantarflexion stretch - 10x5"  Standing gastroc stretch - 10x5"  Standing soleus stretch - 10x5"  Ankle circles - 10x ea    Charo received the following manual therapy techniques for 0 minutes, including:      Home Exercises and Patient Education Provided    Education provided re: therapeutic diagnosis and plan of care    Written Home Exercises Provided: Yes  Exercises were reviewed and Charo was able to demonstrate them prior to the end of the session.   Pt received a written copy of exercises to perform at home. Charo " demonstrated good  understanding of the education provided.     See EMR under patient instructions for exercises given.     Assessment     Charo is a 40 y.o. male referred to outpatient Physical Therapy with a medical diagnosis of closed fracture of calcaneus in 4/2019 with subsequent ORIF. Pt presents with objective deficits in affected ankle P/AROM, ankle strength, tolerance for loading in gait that limits functional ability to stand, walk, squat, stairs.    Pt prognosis is Fair.   Pt will benefit from skilled outpatient Physical Therapy to address the deficits stated above and in the chart below, provide pt/family education, and to maximize pt's level of independence.     Plan of care discussed with patient: Yes  Pt's spiritual, cultural and educational needs considered and patient is agreeable to the plan of care and goals as stated below:     Anticipated Barriers for therapy: None    Medical Necessity is demonstrated by the following  History  Co-morbidities and personal factors that may impact the plan of care Co-morbidities:   See chart    Personal Factors:   no deficits     low   Examination  Body Structures and Functions, activity limitations and participation restrictions that may impact the plan of care Body Regions:   lower extremities    Body Systems:    gait    Participation Restrictions:   None    Activity limitations:   Learning and applying knowledge  no deficits    General Tasks and Commands  no deficits    Communication  no deficits    Mobility  walking    Self care  no deficits    Domestic Life  no deficits    Interactions/Relationships  no deficits    Life Areas  no deficits    Community and Social Life  no deficits         low   Clinical Presentation stable and uncomplicated low   Decision Making/ Complexity Score: low       Goals:  Short Term Goals: 4 weeks   1. The patient will demonstrate 5 degrees of affected ankle dorsiflexion AROM to increase ease with standing and walking.  2. The  patient will demonstrate 5/5 strength of affected ankle dorsiflexion to increase ease with standing and walking.  3. The patient will demonstrate 10 seconds of eyes open single leg stance to increase ease with walking comfortably and symmetrically.     Long Term Goals: 12 weeks   1. The patient will demonstrate 5/5 strength of affected ankle in all planes to increase ease with walking, stairs.  2. The patient will demonstrate the ability to walk 20 minutes without discomfort.  3. The patient will ascend and decend stairs without limitation.    Plan   Certification Period/Plan of care expiration: 11/3/2020 to 1/28/2021.    Outpatient Physical Therapy 2 times weekly for 12 weeks to include the following interventions: manual therapy as needed, therapeutic exercises to address functional deficits, modalities prn, wound care prn, dry needling prn, treatment by a skilled PTA at times.    Maximilian Rawls, PT

## 2020-11-03 NOTE — PLAN OF CARE
[facility]  Physical Therapy Initial Evaluation    Name: Charo Padron  Clinic Number: [id number]    Therapy Diagnosis:   Encounter Diagnosis   Name Primary?    Chronic heel pain, left      Physician: Fatmata Good PA-C    Physician Orders: PT Eval and Treat  Medical Diagnosis: Closed displaced fracture of left calcaneus with routine healing, unspecified portion of calcaneus, subsequent encounter   Evaluation Date: 11/3/2020  Authorization Period Expiration: 1/3/2021  Plan of Care Certification Period: 1/28/2021  Visit # / Visits authorized: 1/12  Date of Surgery: 4/18/2019  Precautions: Standard    Time In: 120p  Time Out: 205p  Total Billable Time: 45 minutes    Subjective     Date of onset: 4/18/2019  History of current condition - Charo reports: that he is feeling swelling and pain.  He states that he has been in and out of his boot since September of last year when he was weaning out of his boot in therapy.  He states that he was unable to continue therapy due to lack of a ride.  The patient states that he is limited in walking, standing, stairs.  He states that he feels more pain when it is cold or rainy, feels soreness and tenderness around his screws.  He states that he can't wear shoes often because of ankle swelling.  He states that he wants to be able to walk without a limp.       Past Medical History:   Diagnosis Date    Charlie Padron  has a past surgical history that includes Open reduction and internal fixation (ORIF) of fracture of calcaneus (Left, 4/18/2019) and Open reduction and internal fixation (ORIF) of injury of ankle (Left, 4/25/2019).    Charo has a current medication list which includes the following prescription(s): meloxicam, and the following Facility-Administered Medications: sodium chloride 0.9% and mupirocin.    Review of patient's allergies indicates:  No Known Allergies     Prior Therapy: Yes  Social History: Lives  "alone in a home  Occupation: Not currently employed  Prior Level of Function: No limitations  Current Level of Function: Limited in standing, walking, squatting, stairs    Pain:  Current 2/10, worst 6/10, best 1/10   Location: left ankles  Description: Aching, Dull, Throbbing, Tight and Deep    Pts goals: To walk without a limp    Objective     Range of Motion:   Ankle Right (Active/Passive) Left (Active/Passive)   Dorsiflexion 15 degrees 0/5 degrees   Plantarflexion 45 degrees 10/15 degrees   Inversion 20 degrees 5/10 degrees   Eversion 5 degrees 0 degrees      Strength:  Ankle Right Left   Dorsiflexion 5/5 4/5   Plantarflexion 5/5 3/5   Inversion 5/5 4/5   Eversion 5/5 4/5       CMS Impairment/Limitation/Restriction for FOTO Ankle Survey    Therapist reviewed FOTO scores for Charo Padron on 11/3/2020.   FOTO documents entered into [employer] - see Media section.    Limitation Score: 58%  Category: Mobility    Current : CK = at least 40% but < 60% impaired, limited or restricted  Goal: CJ = at least 20% but < 40% impaired, limited or restricted  Discharge:          TREATMENT     Treatment Time In: 135p  Treatment Time Out: 205p  Total Treatment time separate from Evaluation time:30 minutes    Charo received therapeutic exercises to develop strength, endurance, ROM, flexibility and core stabilization for 30 minutes including:  Education in diagnosis and plan of care  Self plantarflexion stretch - 10x5"  Standing gastroc stretch - 10x5"  Standing soleus stretch - 10x5"  Ankle circles - 10x ea    Charo received the following manual therapy techniques for 0 minutes, including:      Home Exercises and Patient Education Provided    Education provided re: therapeutic diagnosis and plan of care    Written Home Exercises Provided: Yes  Exercises were reviewed and Charo was able to demonstrate them prior to the end of the session.   Pt received a written copy of exercises to perform at home. Charo " demonstrated good  understanding of the education provided.     See EMR under patient instructions for exercises given.     Assessment     Charo is a 40 y.o. male referred to outpatient Physical Therapy with a medical diagnosis of closed fracture of calcaneus in 4/2019 with subsequent ORIF. Pt presents with objective deficits in affected ankle P/AROM, ankle strength, tolerance for loading in gait that limits functional ability to stand, walk, squat, stairs.    Pt prognosis is Fair.   Pt will benefit from skilled outpatient Physical Therapy to address the deficits stated above and in the chart below, provide pt/family education, and to maximize pt's level of independence.     Plan of care discussed with patient: Yes  Pt's spiritual, cultural and educational needs considered and patient is agreeable to the plan of care and goals as stated below:     Anticipated Barriers for therapy: None    Medical Necessity is demonstrated by the following  History  Co-morbidities and personal factors that may impact the plan of care Co-morbidities:   See chart    Personal Factors:   no deficits     low   Examination  Body Structures and Functions, activity limitations and participation restrictions that may impact the plan of care Body Regions:   lower extremities    Body Systems:    gait    Participation Restrictions:   None    Activity limitations:   Learning and applying knowledge  no deficits    General Tasks and Commands  no deficits    Communication  no deficits    Mobility  walking    Self care  no deficits    Domestic Life  no deficits    Interactions/Relationships  no deficits    Life Areas  no deficits    Community and Social Life  no deficits         low   Clinical Presentation stable and uncomplicated low   Decision Making/ Complexity Score: low       Goals:  Short Term Goals: 4 weeks   1. The patient will demonstrate 5 degrees of affected ankle dorsiflexion AROM to increase ease with standing and walking.  2. The  patient will demonstrate 5/5 strength of affected ankle dorsiflexion to increase ease with standing and walking.  3. The patient will demonstrate 10 seconds of eyes open single leg stance to increase ease with walking comfortably and symmetrically.     Long Term Goals: 12 weeks   1. The patient will demonstrate 5/5 strength of affected ankle in all planes to increase ease with walking, stairs.  2. The patient will demonstrate the ability to walk 20 minutes without discomfort.  3. The patient will ascend and decend stairs without limitation.    Plan   Certification Period/Plan of care expiration: 11/3/2020 to 1/28/2021.    Outpatient Physical Therapy 2 times weekly for 12 weeks to include the following interventions: manual therapy as needed, therapeutic exercises to address functional deficits, modalities prn, wound care prn, dry needling prn, treatment by a skilled PTA at times.    Maximilian Rawls, PT

## 2020-11-04 ENCOUNTER — CLINICAL SUPPORT (OUTPATIENT)
Dept: REHABILITATION | Facility: HOSPITAL | Age: 40
End: 2020-11-04
Payer: MEDICAID

## 2020-11-04 DIAGNOSIS — M79.672 CHRONIC HEEL PAIN, LEFT: ICD-10-CM

## 2020-11-04 DIAGNOSIS — G89.29 CHRONIC HEEL PAIN, LEFT: ICD-10-CM

## 2020-11-04 PROCEDURE — 97110 THERAPEUTIC EXERCISES: CPT

## 2020-11-04 NOTE — PROGRESS NOTES
"                            Physical Therapy Daily Treatment Note     Name: Charo Decker [facility] Number: [id number]    Therapy Diagnosis:   Encounter Diagnosis   Name Primary?    Chronic heel pain, left      Physician: Fatmata Good PA-C    Visit Date: 11/4/2020  Physician Orders: PT Eval and Treat  Medical Diagnosis: Closed displaced fracture of left calcaneus with routine healing, unspecified portion of calcaneus, subsequent encounter   Evaluation Date: 11/3/2020  Authorization Period Expiration: 1/3/2021  Plan of Care Certification Period: 1/28/2021  Visit # / Visits authorized: 2/12  Date of Surgery: 4/18/2019  Precautions: Standard    Time In: 1210p  Time Out: 1250p  Total Billable Time: 40 minutes    Subjective      Pt reports: he was compliant with home exercise program given last session.   Response to previous treatment: Patient states that he is feeling a little sore but no pain    Pain: 2/10  Location: left ankles     Objective     Charo received therapeutic exercises to develop strength, endurance, ROM and flexibility for 40 minutes including:  Ankle plantarflexion theraband - 10x10", blue  Ankle dorsiflexion on stepstool - 10x3"  Tiltboard - 15x double leg, 10x single leg, 15x side to side  Shuttle DLP - 15x, 3 black  Shuttle DL HR - 15x, 2 black  Walking backwards - 1 lap  Marching slow - 1 lap  Step up - 15xL3  Lateral heel tap - 2 x 10, L3      Charo received the following manual therapy techniques for 5 minutes, including:  PROM affected ankle all planes    Home Exercises Provided and Patient Education Provided     Education provided:   Continue current HEP    Written Home Exercises Provided: Continue with current HEP  Exercises were reviewed and Charo was able to demonstrate them prior to the end of the session.      Pt received a written copy of exercises to perform at home.   See EMR under patient instructions for exercises given.     Charo demonstrated good  understanding " of the education provided.     Assessment     The patient demonstrates improvement in ability to tolerate dorsiflexion in weightbearing and improved acceptance of load through affectd leg during gait  Charo is progressing well towards his goals.   Pt prognosis is Good.     Pt will continue to benefit from skilled outpatient physical therapy to address the deficits listed in the problem list box on initial evaluation, provide pt/family education and to maximize pt's level of independence in the home and community environment.     Pt's spiritual, cultural and educational needs considered and pt agreeable to plan of care and goals.    Anticipated barriers to physical therapy: None    Goals:   Short Term Goals: 4 weeks   1. The patient will demonstrate 5 degrees of affected ankle dorsiflexion AROM to increase ease with standing and walking.  2. The patient will demonstrate 5/5 strength of affected ankle dorsiflexion to increase ease with standing and walking.  3. The patient will demonstrate 10 seconds of eyes open single leg stance to increase ease with walking comfortably and symmetrically.      Long Term Goals: 12 weeks   1. The patient will demonstrate 5/5 strength of affected ankle in all planes to increase ease with walking, stairs.  2. The patient will demonstrate the ability to walk 20 minutes without discomfort.  3. The patient will ascend and decend stairs without limitation.    Plan     Progress to more single leg strengthening and emphasis on dorsiflexion in weightbearing    Maximilian Rawls, PT

## 2020-11-10 ENCOUNTER — CLINICAL SUPPORT (OUTPATIENT)
Dept: REHABILITATION | Facility: HOSPITAL | Age: 40
End: 2020-11-10
Payer: MEDICAID

## 2020-11-10 DIAGNOSIS — M79.672 CHRONIC HEEL PAIN, LEFT: ICD-10-CM

## 2020-11-10 DIAGNOSIS — G89.29 CHRONIC HEEL PAIN, LEFT: ICD-10-CM

## 2020-11-10 PROCEDURE — 97110 THERAPEUTIC EXERCISES: CPT

## 2020-11-10 NOTE — PROGRESS NOTES
"                            Physical Therapy Daily Treatment Note     Name: Charo Decker [facility] Number: [id number]    Therapy Diagnosis:   Encounter Diagnosis   Name Primary?    Chronic heel pain, left      Physician: Fatmata Good PA-C    Visit Date: 11/10/2020  Physician Orders: PT Eval and Treat  Medical Diagnosis: Closed displaced fracture of left calcaneus with routine healing, unspecified portion of calcaneus, subsequent encounter   Evaluation Date: 11/3/2020  Authorization Period Expiration: 1/3/2021  Plan of Care Certification Period: 1/28/2021  Visit # / Visits authorized: 3/12  Date of Surgery: 4/18/2019  Precautions: Standard    Time In: 220p  Time Out: 250p  Total Billable Time: 30 minutes    Subjective      Pt reports: he was compliant with home exercise program given last session.   Response to previous treatment: Patient states that he is feeling about the same as before he started therapy.  He states that he is in a lot of pain today because of the weather.    Pain: 2/10  Location: left ankles     Objective     Charo received therapeutic exercises to develop strength, endurance, ROM and flexibility for 25 minutes including:  Bicycle - 5'  DKSA dorsiflexion stretch long sitting - 10x5"  Supine 90/90 stretch - 10x5"  Seated heelslide for dorsiflexion - 10x5"  Seated heel raise - 10x3"  Tiltboard - 15x double leg A/P    Charo received the following manual therapy techniques for 5 minutes, including:  PROM affected ankle all planes    Home Exercises Provided and Patient Education Provided     Education provided:   Continue current HEP    Written Home Exercises Provided: Continue with current HEP  Exercises were reviewed and Charo was able to demonstrate them prior to the end of the session.      Pt received a written copy of exercises to perform at home.   See EMR under patient instructions for exercises given.     Charo demonstrated good  understanding of the education provided. "     Assessment     The patient demonstrates poor tolerance for PROM of affected ankle and poor loading of affected ankle in gait today.  He was unable to achieve single leg stance due to pain.  At last visit, he was able to tolerate more aggressive exercise and weightbearing mobilization of affected ankle.      Charo is progressing well towards his goals.   Pt prognosis is Good.     Pt will continue to benefit from skilled outpatient physical therapy to address the deficits listed in the problem list box on initial evaluation, provide pt/family education and to maximize pt's level of independence in the home and community environment.     Pt's spiritual, cultural and educational needs considered and pt agreeable to plan of care and goals.    Anticipated barriers to physical therapy: None    Goals:   Short Term Goals: 4 weeks   1. The patient will demonstrate 5 degrees of affected ankle dorsiflexion AROM to increase ease with standing and walking.  2. The patient will demonstrate 5/5 strength of affected ankle dorsiflexion to increase ease with standing and walking.  3. The patient will demonstrate 10 seconds of eyes open single leg stance to increase ease with walking comfortably and symmetrically.      Long Term Goals: 12 weeks   1. The patient will demonstrate 5/5 strength of affected ankle in all planes to increase ease with walking, stairs.  2. The patient will demonstrate the ability to walk 20 minutes without discomfort.  3. The patient will ascend and decend stairs without limitation.    Plan     Progress to more single leg strengthening and emphasis on dorsiflexion in weightbearing    Maximilian Rawls, PT

## 2020-11-27 NOTE — PROGRESS NOTES
"                            Physical Therapy Daily Treatment Note     Name: Charo Decker Vito  [facility] Number: [id number]    Therapy Diagnosis:   Encounter Diagnosis   Name Primary?    Chronic heel pain, left      Physician: Fatmata Good PA-C    Visit Date: 11/30/2020  Physician Orders: PT Eval and Treat  Medical Diagnosis: Closed displaced fracture of left calcaneus with routine healing, unspecified portion of calcaneus, subsequent encounter   Evaluation Date: 11/3/2020  Authorization Period Expiration: 1/3/2021  Plan of Care Certification Period: 1/28/2021  Visit # / Visits authorized: 4/12  Date of Surgery: 4/18/2019  Precautions: Standard    Time In: 4:07 pm (pt arrived late)  Time Out: 4:47 pm  Total Billable Time: 40 minutes    Subjective      Pt reports: he was not compliant with home exercise program given last session.   Response to previous treatment: Pt reports that he is feeling very sore and tender. Pt reports that the areas where the screws are placed are bothering him because of the cold weather.     Pain: 8/10  Location: left ankles     Objective     Charo received therapeutic exercises to develop strength, endurance, ROM and flexibility for 25 minutes including:    Bicycle - 5'  Seated heelslide for dorsiflexion - 10x5"  Long leg sitting ankle circles cw,ccw 20x ea   Seated heel raise - 10x3"  Tiltboard - 10x single leg A/P    DKSA dorsiflexion stretch long sitting - 10x5" - not performed   Supine 90/90 stretch - 10x5"- not performed     Charo received the following manual therapy techniques for 15 minutes, including:  PROM affected ankle all planes    Home Exercises Provided and Patient Education Provided     Education provided:   Continue current HEP    Written Home Exercises Provided: Continue with current HEP  Exercises were reviewed and Charo was able to demonstrate them prior to the end of the session.      Pt received a written copy of exercises to perform at home.   See EMR " under patient instructions for exercises given.     Charo demonstrated good  understanding of the education provided.     Assessment     Pt with slow pacing throughout therex and required frequent rest breaks in order to perform multiple sets of exercises. Pt with edema noted throughout left ankle, most notably posterior and inferior to the medial mallelous. Stressed to pt about the importance of performing at least some of his HEP and how it can help his mobility, pain, and swelling; as pt had not been performing HEP consistently. PROM in all planes of left ankle revealed decreased ROM in all directions. Improvements noted with continued passive motion.     Charo is progressing well towards his goals.   Pt prognosis is Good.     Pt will continue to benefit from skilled outpatient physical therapy to address the deficits listed in the problem list box on initial evaluation, provide pt/family education and to maximize pt's level of independence in the home and community environment.     Pt's spiritual, cultural and educational needs considered and pt agreeable to plan of care and goals.    Anticipated barriers to physical therapy: None    Goals:   Short Term Goals: 4 weeks   1. The patient will demonstrate 5 degrees of affected ankle dorsiflexion AROM to increase ease with standing and walking.  2. The patient will demonstrate 5/5 strength of affected ankle dorsiflexion to increase ease with standing and walking.  3. The patient will demonstrate 10 seconds of eyes open single leg stance to increase ease with walking comfortably and symmetrically.      Long Term Goals: 12 weeks   1. The patient will demonstrate 5/5 strength of affected ankle in all planes to increase ease with walking, stairs.  2. The patient will demonstrate the ability to walk 20 minutes without discomfort.  3. The patient will ascend and decend stairs without limitation.    Plan     Progress to more single leg strengthening and emphasis on  dorsiflexion in weightbearing    Daily Robb, PTA

## 2020-11-30 ENCOUNTER — CLINICAL SUPPORT (OUTPATIENT)
Dept: REHABILITATION | Facility: HOSPITAL | Age: 40
End: 2020-11-30
Payer: MEDICAID

## 2020-11-30 DIAGNOSIS — G89.29 CHRONIC HEEL PAIN, LEFT: ICD-10-CM

## 2020-11-30 DIAGNOSIS — M79.672 CHRONIC HEEL PAIN, LEFT: ICD-10-CM

## 2020-11-30 PROCEDURE — 97140 MANUAL THERAPY 1/> REGIONS: CPT | Mod: CQ

## 2020-11-30 PROCEDURE — 97110 THERAPEUTIC EXERCISES: CPT | Mod: CQ

## 2020-12-01 NOTE — PROGRESS NOTES
"                            Physical Therapy Daily Treatment Note     Name: Charo Padron  Clinic Number: [id number]    Therapy Diagnosis:   No diagnosis found.  Physician: Fatmata Good PA-C    Visit Date: 12/2/2020  Physician Orders: PT Eval and Treat  Medical Diagnosis: Closed displaced fracture of left calcaneus with routine healing, unspecified portion of calcaneus, subsequent encounter   Evaluation Date: 11/3/2020  Authorization Period Expiration: 1/3/2021  Plan of Care Certification Period: 1/28/2021  Visit # / Visits authorized: 5/12  Date of Surgery: 4/18/2019  Precautions: Standard    Time In: 4:10 pm (pt arrived late)  Time Out: 4:46 pm  Total Billable Time: 36 minutes    Subjective      Pt reports: he was not compliant with home exercise program given last session.   Response to previous treatment: Pt reports his ankle continues to cause him pain and states that he feels like it is not getting any better.      Pain: 8/10  Location: left ankles     Objective     Charo received therapeutic exercises to develop strength, endurance, ROM and flexibility for 26 minutes including:    Bicycle - 5'  Seated heelslide for dorsiflexion - 20x5"  Long leg sitting ankle circles cw,ccw 20x ea   Ankle inv/evr w/ towel 10x  Seated heel raise - 20x3"  Tiltboard - 20x single leg A/P    DKSA dorsiflexion stretch long sitting - 10x5" - not performed   Supine 90/90 stretch - 10x5"- not performed     Charo received the following manual therapy techniques for 10 minutes, including:  PROM affected ankle all planes    Home Exercises Provided and Patient Education Provided     Education provided:   Continue current HEP    Written Home Exercises Provided: Continue with current HEP  Exercises were reviewed and Charo was able to demonstrate them prior to the end of the session.      Pt received a written copy of exercises to perform at home.   See EMR under patient instructions for exercises given.     Charo " "demonstrated good  understanding of the education provided.     Assessment     Pt remains with edema in left ankle, as well as increased temperature in anterior joint. Pt advised to wear compression sock/stocking in order to aid in decreased swelling and inflammation; he states that he had an "ankle sock" and he wears it periodically. Pt exhibited limitations in all planes of PROM of the left ankle during manual therapy. Focused on calcaneal movement with manual therapy in order to ensure fluid movement.     Charo is progressing well towards his goals.   Pt prognosis is Good.     Pt will continue to benefit from skilled outpatient physical therapy to address the deficits listed in the problem list box on initial evaluation, provide pt/family education and to maximize pt's level of independence in the home and community environment.     Pt's spiritual, cultural and educational needs considered and pt agreeable to plan of care and goals.    Anticipated barriers to physical therapy: None    Goals:   Short Term Goals: 4 weeks   1. The patient will demonstrate 5 degrees of affected ankle dorsiflexion AROM to increase ease with standing and walking.  2. The patient will demonstrate 5/5 strength of affected ankle dorsiflexion to increase ease with standing and walking.  3. The patient will demonstrate 10 seconds of eyes open single leg stance to increase ease with walking comfortably and symmetrically.      Long Term Goals: 12 weeks   1. The patient will demonstrate 5/5 strength of affected ankle in all planes to increase ease with walking, stairs.  2. The patient will demonstrate the ability to walk 20 minutes without discomfort.  3. The patient will ascend and decend stairs without limitation.    Plan     Progress to more single leg strengthening and emphasis on dorsiflexion in weightbearing    Daily Robb PTA     "

## 2020-12-02 ENCOUNTER — CLINICAL SUPPORT (OUTPATIENT)
Dept: REHABILITATION | Facility: HOSPITAL | Age: 40
End: 2020-12-02
Payer: MEDICAID

## 2020-12-02 DIAGNOSIS — M79.672 CHRONIC HEEL PAIN, LEFT: ICD-10-CM

## 2020-12-02 DIAGNOSIS — G89.29 CHRONIC HEEL PAIN, LEFT: ICD-10-CM

## 2020-12-02 PROCEDURE — 97110 THERAPEUTIC EXERCISES: CPT | Mod: CQ

## 2020-12-02 PROCEDURE — 97140 MANUAL THERAPY 1/> REGIONS: CPT | Mod: CQ

## 2020-12-09 ENCOUNTER — CLINICAL SUPPORT (OUTPATIENT)
Dept: REHABILITATION | Facility: HOSPITAL | Age: 40
End: 2020-12-09
Payer: MEDICAID

## 2020-12-09 DIAGNOSIS — G89.29 CHRONIC HEEL PAIN, LEFT: ICD-10-CM

## 2020-12-09 DIAGNOSIS — M79.672 CHRONIC HEEL PAIN, LEFT: ICD-10-CM

## 2020-12-09 PROCEDURE — 97110 THERAPEUTIC EXERCISES: CPT

## 2020-12-09 PROCEDURE — 97140 MANUAL THERAPY 1/> REGIONS: CPT

## 2020-12-09 NOTE — PROGRESS NOTES
"                            Physical Therapy Daily Treatment Note     Name: Charo Decker Vito  [facility] Number: [id number]    Therapy Diagnosis:   Encounter Diagnosis   Name Primary?    Chronic heel pain, left      Physician: Fatmata Good PA-C    Visit Date: 12/9/2020  Physician Orders: PT Eval and Treat  Medical Diagnosis: Closed displaced fracture of left calcaneus with routine healing, unspecified portion of calcaneus, subsequent encounter   Evaluation Date: 11/3/2020  Authorization Period Expiration: 1/3/2021  Plan of Care Certification Period: 1/28/2021  Visit # / Visits authorized: 6/12  Date of Surgery: 4/18/2019  Precautions: Standard    Time In: 2:55p  Time Out: 330p  Total Billable Time: 35 minutes    Subjective      Pt reports: he was not compliant with home exercise program given last session.   Response to previous treatment: Pt reports that his ankle still has not changed since beginning therapy, he can't walk more than 1/2 a block before pain becomes intense     Pain: 8/10  Location: left ankles     Objective     Charo received therapeutic exercises to develop strength, endurance, ROM and flexibility for 25 minutes including:    Bicycle - 7'  Dorsiflexion mobilization on stool - 2 x 10  Shuttle DLP - 2 x 12, 2 black  Marching - 2 laps  Seated heelslide for dorsiflexion - 20x5"  Seated heel raise - 20x3"  Long leg sitting ankle circles cw,ccw 20x ea - NP  Ankle inv/evr w/ towel 10x - NP  Tiltboard - 20x single leg A/P - NP  DKSA dorsiflexion stretch long sitting - 10x5" - not performed   Supine 90/90 stretch - 10x5"- not performed     Charo received the following manual therapy techniques for 10 minutes, including:    Home Exercises Provided and Patient Education Provided     Education provided:   Continue current HEP    Written Home Exercises Provided: Continue with current HEP  Exercises were reviewed and Charo was able to demonstrate them prior to the end of the session.      Pt " received a written copy of exercises to perform at home.   See EMR under patient instructions for exercises given.     Charo demonstrated good  understanding of the education provided.     Assessment     The patient has poor tolerance for closed chain strengthening and self-ankle mobilizations.  He has limitations in all planes of motions and weakness with combined motions of ankle.    Chrao is progressing well towards his goals.   Pt prognosis is Good.     Pt will continue to benefit from skilled outpatient physical therapy to address the deficits listed in the problem list box on initial evaluation, provide pt/family education and to maximize pt's level of independence in the home and community environment.     Pt's spiritual, cultural and educational needs considered and pt agreeable to plan of care and goals.    Anticipated barriers to physical therapy: None    Goals:   Short Term Goals: 4 weeks   1. The patient will demonstrate 5 degrees of affected ankle dorsiflexion AROM to increase ease with standing and walking.  2. The patient will demonstrate 5/5 strength of affected ankle dorsiflexion to increase ease with standing and walking.  3. The patient will demonstrate 10 seconds of eyes open single leg stance to increase ease with walking comfortably and symmetrically.      Long Term Goals: 12 weeks   1. The patient will demonstrate 5/5 strength of affected ankle in all planes to increase ease with walking, stairs.  2. The patient will demonstrate the ability to walk 20 minutes without discomfort.  3. The patient will ascend and decend stairs without limitation.    Plan     Focus on patient's decreased ROM and strength for functional ambulation    Maximilian Rawls, PT

## 2020-12-14 ENCOUNTER — CLINICAL SUPPORT (OUTPATIENT)
Dept: REHABILITATION | Facility: HOSPITAL | Age: 40
End: 2020-12-14
Payer: MEDICAID

## 2020-12-14 DIAGNOSIS — M79.672 CHRONIC HEEL PAIN, LEFT: ICD-10-CM

## 2020-12-14 DIAGNOSIS — G89.29 CHRONIC HEEL PAIN, LEFT: ICD-10-CM

## 2020-12-14 PROCEDURE — 97110 THERAPEUTIC EXERCISES: CPT

## 2020-12-14 NOTE — PROGRESS NOTES
"                            Physical Therapy Daily Treatment Note     Name: Charo Decker [facility] Number: [id number]    Therapy Diagnosis:   Encounter Diagnosis   Name Primary?    Chronic heel pain, left      Physician: Fatmata Good PA-C    Visit Date: 12/14/2020  Physician Orders: PT Eval and Treat  Medical Diagnosis: Closed displaced fracture of left calcaneus with routine healing, unspecified portion of calcaneus, subsequent encounter   Evaluation Date: 11/3/2020  Authorization Period Expiration: 1/3/2021  Plan of Care Certification Period: 1/28/2021  Visit # / Visits authorized: 7/12  Date of Surgery: 4/18/2019  Precautions: Standard    Time In: 515  Time Out: 558  Total Billable Time: 43 minutes    Subjective      Pt reports: he was not compliant with home exercise program given last session.   Response to previous treatment: His foot continues to bother him when he stands or walks     Pain: 8/10  Location: left ankles     Objective     Charo received therapeutic exercises to develop strength, endurance, ROM and flexibility for 43 minutes including:    Bicycle - 7'  Gastroc stretch on wedge 30 sec x 4  Active Soleus Stretch (knee to wall) 2x10   1/2 foam roller rocking 30x  - DF/PF  - Inv/Ev  Ankle Circles 30x   Gastroc Stretch with strap 30 sec x 4  Shuttle calf raises 1 cord       Not Performed   Dorsiflexion mobilization on stool - 2 x 10  Shuttle DLP - 2 x 12, 2 black  Marching - 2 laps  Seated heelslide for dorsiflexion - 20x5"  Seated heel raise - 20x3"  Long leg sitting ankle circles cw,ccw 20x ea - NP  Ankle inv/evr w/ towel 10x - NP  Tiltboard - 20x single leg A/P - NP  DKSA dorsiflexion stretch long sitting - 10x5" - not performed   Supine 90/90 stretch - 10x5"- not performed     Charo received the following manual therapy techniques for 0 minutes, including:    Home Exercises Provided and Patient Education Provided     Education provided:   Continue current HEP    Written Home " Exercises Provided: Continue with current HEP  Exercises were reviewed and Charo was able to demonstrate them prior to the end of the session.      Pt received a written copy of exercises to perform at home.   See EMR under patient instructions for exercises given.     Charo demonstrated good  understanding of the education provided.     Assessment   Patient continues to have increased pain and has poor tolerance to exercises. Patient could not tolerate single leg balancing exercises due to pain. Patient continues to lack full range of motion of ankle and has decreased strength in ankle.   Charo is progressing well towards his goals.   Pt prognosis is Good.     Pt will continue to benefit from skilled outpatient physical therapy to address the deficits listed in the problem list box on initial evaluation, provide pt/family education and to maximize pt's level of independence in the home and community environment.     Pt's spiritual, cultural and educational needs considered and pt agreeable to plan of care and goals.    Anticipated barriers to physical therapy: None    Goals:   Short Term Goals: 4 weeks   1. The patient will demonstrate 5 degrees of affected ankle dorsiflexion AROM to increase ease with standing and walking.  2. The patient will demonstrate 5/5 strength of affected ankle dorsiflexion to increase ease with standing and walking.  3. The patient will demonstrate 10 seconds of eyes open single leg stance to increase ease with walking comfortably and symmetrically.      Long Term Goals: 12 weeks   1. The patient will demonstrate 5/5 strength of affected ankle in all planes to increase ease with walking, stairs.  2. The patient will demonstrate the ability to walk 20 minutes without discomfort.  3. The patient will ascend and decend stairs without limitation.    Plan     Focus on patient's decreased ROM and strength for functional ambulation    Carmine Ramirez, PT

## 2020-12-14 NOTE — PROGRESS NOTES
"                            Physical Therapy Daily Treatment Note     Name: Charo Padron  [facility] Number: [id number]    Therapy Diagnosis:   No diagnosis found.  Physician: Fatmata Good PA-C    Visit Date: 12/14/2020  Physician Orders: PT Eval and Treat  Medical Diagnosis: Closed displaced fracture of left calcaneus with routine healing, unspecified portion of calcaneus, subsequent encounter   Evaluation Date: 11/3/2020  Authorization Period Expiration: 1/3/2021  Plan of Care Certification Period: 1/28/2021  Visit # / Visits authorized: 7/12  Date of Surgery: 4/18/2019  Precautions: Standard    Time In: 2:55***p  Time Out: 330***p  Total Billable Time: 35*** minutes    Subjective      Pt reports: he was not compliant with home exercise program given last session.   Response to previous treatment: ***Pt reports that his ankle still has not changed since beginning therapy, he can't walk more than 1/2 a block before pain becomes intense     Pain: 8/10  Location: left ankles     Objective     Charo received therapeutic exercises to develop strength, endurance, ROM and flexibility for 25*** minutes including:    Bicycle - 7'  Dorsiflexion mobilization on stool - 2 x 10  Shuttle DLP - 2 x 12, 2 black  Marching - 2 laps  Seated heelslide for dorsiflexion - 20x5"  Seated heel raise - 20x3"  Long leg sitting ankle circles cw,ccw 20x ea - NP  Ankle inv/evr w/ towel 10x - NP  Tiltboard - 20x single leg A/P - NP  DKSA dorsiflexion stretch long sitting - 10x5" - not performed   Supine 90/90 stretch - 10x5"- not performed     Charo received the following manual therapy techniques for 10*** minutes, including:    Home Exercises Provided and Patient Education Provided     Education provided:   Continue current HEP    Written Home Exercises Provided: Continue with current HEP  Exercises were reviewed and Charo was able to demonstrate them prior to the end of the session.      Pt received a written copy of " exercises to perform at home.   See EMR under patient instructions for exercises given.     Charo demonstrated good  understanding of the education provided.     Assessment     ***The patient has poor tolerance for closed chain strengthening and self-ankle mobilizations.  He has limitations in all planes of motions and weakness with combined motions of ankle.    Charo is progressing well towards his goals.   Pt prognosis is Good.     Pt will continue to benefit from skilled outpatient physical therapy to address the deficits listed in the problem list box on initial evaluation, provide pt/family education and to maximize pt's level of independence in the home and community environment.     Pt's spiritual, cultural and educational needs considered and pt agreeable to plan of care and goals.    Anticipated barriers to physical therapy: None    Goals:   Short Term Goals: 4 weeks   1. The patient will demonstrate 5 degrees of affected ankle dorsiflexion AROM to increase ease with standing and walking.  2. The patient will demonstrate 5/5 strength of affected ankle dorsiflexion to increase ease with standing and walking.  3. The patient will demonstrate 10 seconds of eyes open single leg stance to increase ease with walking comfortably and symmetrically.      Long Term Goals: 12 weeks   1. The patient will demonstrate 5/5 strength of affected ankle in all planes to increase ease with walking, stairs.  2. The patient will demonstrate the ability to walk 20 minutes without discomfort.  3. The patient will ascend and decend stairs without limitation.    Plan     Focus on patient's decreased ROM and strength for functional ambulation    Daily Robb PTA

## 2020-12-15 ENCOUNTER — OFFICE VISIT (OUTPATIENT)
Dept: ORTHOPEDICS | Facility: CLINIC | Age: 40
End: 2020-12-15
Payer: MEDICAID

## 2020-12-15 VITALS
TEMPERATURE: 98 F | HEIGHT: 65 IN | BODY MASS INDEX: 29.02 KG/M2 | DIASTOLIC BLOOD PRESSURE: 88 MMHG | HEART RATE: 60 BPM | SYSTOLIC BLOOD PRESSURE: 128 MMHG | WEIGHT: 174.19 LBS

## 2020-12-15 DIAGNOSIS — S82.842D CLOSED BIMALLEOLAR FRACTURE OF LEFT ANKLE WITH ROUTINE HEALING, SUBSEQUENT ENCOUNTER: ICD-10-CM

## 2020-12-15 DIAGNOSIS — S92.002D CLOSED DISPLACED FRACTURE OF LEFT CALCANEUS WITH ROUTINE HEALING, UNSPECIFIED PORTION OF CALCANEUS, SUBSEQUENT ENCOUNTER: ICD-10-CM

## 2020-12-15 DIAGNOSIS — S93.492A SPRAIN OF ANTERIOR TALOFIBULAR LIGAMENT OF LEFT ANKLE, INITIAL ENCOUNTER: ICD-10-CM

## 2020-12-15 DIAGNOSIS — M25.672 DECREASED RANGE OF MOTION OF LEFT ANKLE: Primary | ICD-10-CM

## 2020-12-15 DIAGNOSIS — S93.412A SPRAIN OF CALCANEOFIBULAR LIGAMENT OF LEFT ANKLE, INITIAL ENCOUNTER: ICD-10-CM

## 2020-12-15 PROCEDURE — 99999 PR PBB SHADOW E&M-EST. PATIENT-LVL III: ICD-10-PCS | Mod: PBBFAC,,, | Performed by: ORTHOPAEDIC SURGERY

## 2020-12-15 PROCEDURE — 99213 OFFICE O/P EST LOW 20 MIN: CPT | Mod: PBBFAC | Performed by: ORTHOPAEDIC SURGERY

## 2020-12-15 PROCEDURE — 99999 PR PBB SHADOW E&M-EST. PATIENT-LVL III: CPT | Mod: PBBFAC,,, | Performed by: ORTHOPAEDIC SURGERY

## 2020-12-15 PROCEDURE — 99214 OFFICE O/P EST MOD 30 MIN: CPT | Mod: S$PBB,,, | Performed by: ORTHOPAEDIC SURGERY

## 2020-12-15 PROCEDURE — 99214 PR OFFICE/OUTPT VISIT, EST, LEVL IV, 30-39 MIN: ICD-10-PCS | Mod: S$PBB,,, | Performed by: ORTHOPAEDIC SURGERY
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
## 2021-01-05 ENCOUNTER — DOCUMENTATION ONLY (OUTPATIENT)
Dept: REHABILITATION | Facility: HOSPITAL | Age: 41
End: 2021-01-05

## 2021-04-13 ENCOUNTER — TELEPHONE (OUTPATIENT)
Dept: ORTHOPEDICS | Facility: CLINIC | Age: 41
End: 2021-04-13

## 2021-04-20 ENCOUNTER — HOSPITAL ENCOUNTER (OUTPATIENT)
Dept: RADIOLOGY | Facility: HOSPITAL | Age: 41
Discharge: HOME | End: 2021-04-20
Attending: PHYSICIAN ASSISTANT
Payer: MEDICAID

## 2021-04-20 ENCOUNTER — OFFICE VISIT (OUTPATIENT)
Dept: ORTHOPEDICS | Facility: CLINIC | Age: 41
End: 2021-04-20
Payer: MEDICAID

## 2021-04-20 VITALS — BODY MASS INDEX: 29.02 KG/M2 | HEIGHT: 65 IN | WEIGHT: 174.19 LBS

## 2021-04-20 DIAGNOSIS — M25.672 DECREASED RANGE OF MOTION OF LEFT ANKLE: Primary | ICD-10-CM

## 2021-04-20 DIAGNOSIS — S82.842D CLOSED BIMALLEOLAR FRACTURE OF LEFT ANKLE WITH ROUTINE HEALING, SUBSEQUENT ENCOUNTER: ICD-10-CM

## 2021-04-20 DIAGNOSIS — M25.672 DECREASED RANGE OF MOTION OF LEFT ANKLE: ICD-10-CM

## 2021-04-20 PROCEDURE — 73610 XR ANKLE COMPLETE 3 VIEW LEFT: ICD-10-PCS | Mod: 26,LT,, | Performed by: RADIOLOGY

## 2021-04-20 PROCEDURE — 99213 PR OFFICE/OUTPT VISIT, EST, LEVL III, 20-29 MIN: ICD-10-PCS | Mod: S$PBB,,, | Performed by: PHYSICIAN ASSISTANT

## 2021-04-20 PROCEDURE — 99213 OFFICE O/P EST LOW 20 MIN: CPT | Mod: S$PBB,,, | Performed by: PHYSICIAN ASSISTANT

## 2021-04-20 PROCEDURE — 73610 X-RAY EXAM OF ANKLE: CPT | Mod: 26,LT,, | Performed by: RADIOLOGY

## 2021-04-20 PROCEDURE — 99212 OFFICE O/P EST SF 10 MIN: CPT | Mod: PBBFAC,25 | Performed by: PHYSICIAN ASSISTANT

## 2021-04-20 PROCEDURE — 99999 PR PBB SHADOW E&M-EST. PATIENT-LVL II: CPT | Mod: PBBFAC,,, | Performed by: PHYSICIAN ASSISTANT

## 2021-04-20 PROCEDURE — 99999 PR PBB SHADOW E&M-EST. PATIENT-LVL II: ICD-10-PCS | Mod: PBBFAC,,, | Performed by: PHYSICIAN ASSISTANT

## 2021-04-20 PROCEDURE — 73610 X-RAY EXAM OF ANKLE: CPT | Mod: TC,LT

## 2021-10-08 ENCOUNTER — TELEPHONE (OUTPATIENT)
Dept: ORTHOPEDICS | Facility: CLINIC | Age: 41
End: 2021-10-08
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
## 2021-10-21 ENCOUNTER — TELEPHONE (OUTPATIENT)
Dept: ORTHOPEDICS | Facility: CLINIC | Age: 41
End: 2021-10-21

## 2021-11-02 ENCOUNTER — TELEPHONE (OUTPATIENT)
Dept: ORTHOPEDICS | Facility: CLINIC | Age: 41
End: 2021-11-02
Payer: MEDICAID

## 2021-11-03 ENCOUNTER — TELEPHONE (OUTPATIENT)
Dept: ORTHOPEDICS | Facility: CLINIC | Age: 41
End: 2021-11-03
Payer: MEDICAID

## 2022-03-29 ENCOUNTER — HOSPITAL ENCOUNTER (EMERGENCY)
Facility: HOSPITAL | Age: 42
Discharge: HOME | End: 2022-03-29
Attending: EMERGENCY MEDICINE
Payer: MEDICAID

## 2022-03-29 VITALS
BODY MASS INDEX: 22.49 KG/M2 | HEART RATE: 61 BPM | DIASTOLIC BLOOD PRESSURE: 75 MMHG | TEMPERATURE: 98 F | HEIGHT: 65 IN | SYSTOLIC BLOOD PRESSURE: 126 MMHG | OXYGEN SATURATION: 99 % | WEIGHT: 135 LBS | RESPIRATION RATE: 18 BRPM

## 2022-03-29 DIAGNOSIS — H61.22 IMPACTED CERUMEN OF LEFT EAR: Primary | ICD-10-CM

## 2022-03-29 PROCEDURE — 69209 PR REMOVAL IMPACTED CERUMEN USING IRRIGATION/LAVAGE, UNILATERAL: ICD-10-PCS | Mod: LT,,, | Performed by: PHYSICIAN ASSISTANT

## 2022-03-29 PROCEDURE — 69209 REMOVE IMPACTED EAR WAX UNI: CPT | Mod: LT,,, | Performed by: PHYSICIAN ASSISTANT

## 2022-03-29 PROCEDURE — 99284 PR EMERGENCY DEPT VISIT,LEVEL IV: ICD-10-PCS | Mod: 25,,, | Performed by: PHYSICIAN ASSISTANT

## 2022-03-29 PROCEDURE — 99284 EMERGENCY DEPT VISIT MOD MDM: CPT | Mod: 25,,, | Performed by: PHYSICIAN ASSISTANT

## 2022-03-29 PROCEDURE — 25000003 PHARM REV CODE 250: Performed by: PHYSICIAN ASSISTANT

## 2022-03-29 PROCEDURE — 99283 EMERGENCY DEPT VISIT LOW MDM: CPT | Mod: 25

## 2022-03-29 PROCEDURE — 69209 REMOVE IMPACTED EAR WAX UNI: CPT | Mod: LT

## 2022-03-29 RX ADMIN — CARBAMIDE PEROXIDE 6.5% 5 DROP: 6.5 LIQUID AURICULAR (OTIC) at 05:03

## 2022-03-29 NOTE — ED TRIAGE NOTES
Pt reports L ear fullness, states he had this feeling before. Pt denies pain.      LOC: The patient is awake, alert and aware of environment with an appropriate affect, the patient is oriented x 3 and speaking appropriately.  APPEARANCE: Patient resting comfortably and in no acute distress, patient is clean and well groomed, patient's clothing is properly fastened.  SKIN: The skin is warm and dry, color consistent with ethnicity, patient has normal skin turgor and moist mucus membranes, skin intact, no breakdown or bruising noted.  MUSCULOSKELETAL: Patient moving all extremities spontaneously, no obvious swelling or deformities noted.  RESPIRATORY: Airway is open and patent, respirations are spontaneous, patient has a normal effort and rate, no accessory muscle use noted,   CARDIAC: Patient has a normal rate and regular rhythm, no periphreal edema noted, capillary refill < 3 seconds.  ABDOMEN: Soft and non tender to palpation, no distention noted, normoactive bowel sounds present in all four quadrants.  NEUROLOGIC:  facial expression is symmetrical, patient moving all extremities spontaneously, normal sensation in all extremities when touched with a finger.  Follows all commands appropriately.

## 2022-03-29 NOTE — ED PROVIDER NOTES
"Encounter Date: 3/29/2022       History     Chief Complaint   Patient presents with    Ear Fullness     L ear stopped up     40 yo mlae with pmh of seizures presents to the ED for left ear fullness. Pt States his left ear is "stopped up" since Friday. Denies any fever/chills, ear pain, drainage or dizziness. Pt states he tried ear drops at home unsuccessfully.         Review of patient's allergies indicates:  No Known Allergies  Past Medical History:   Diagnosis Date    Seizure      Past Surgical History:   Procedure Laterality Date    OPEN REDUCTION AND INTERNAL FIXATION (ORIF) OF FRACTURE OF CALCANEUS Left 4/18/2019    Procedure: ORIF, FRACTURE, CALCANEUS fracture dislocation;  Surgeon: Ander Mooney MD;  Location: [facility] OR [address] [place] [address];  Service: Orthopedics;  Laterality: Left;    OPEN REDUCTION AND INTERNAL FIXATION (ORIF) OF INJURY OF ANKLE Left 4/25/2019    Procedure: ORIF, ANKLE LEFT;  Surgeon: Goldy Williamson MD;  Location: [facility] OR [address] [place] [address];  Service: Orthopedics;  Laterality: Left;     No family history on file.  Social History     Tobacco Use    Smoking status: Never Smoker    Smokeless tobacco: Never Used   Substance Use Topics    Alcohol use: No    Drug use: No     Review of Systems   Constitutional: Negative for chills and fever.   HENT: Positive for hearing loss. Negative for sore throat.    Eyes: Negative for pain.   Respiratory: Negative for cough and shortness of breath.    Cardiovascular: Negative for chest pain.   Gastrointestinal: Negative for abdominal pain, nausea and vomiting.   Endocrine: Negative.    Genitourinary: Negative for difficulty urinating and dysuria.   Musculoskeletal: Negative.    Skin: Negative.    Allergic/Immunologic: Negative for immunocompromised state.   Neurological: Negative for weakness and headaches.   Hematological: Negative for adenopathy.   Psychiatric/Behavioral: Negative for confusion.       Physical Exam     Initial Vitals [03/29/22 1516]   BP Pulse Resp " Temp SpO2   126/75 61 18 98.1 °F (36.7 °C) 99 %      MAP       --         Physical Exam    Nursing note and vitals reviewed.  Constitutional: He appears well-developed and well-nourished. He is not diaphoretic. No distress.   HENT:   Head: Normocephalic and atraumatic.   Right Ear: External ear normal. No tenderness. No decreased hearing is noted.   Left Ear: External ear normal. No tenderness. Decreased hearing is noted.   Bilateral cerumen, left ear dry and impacted   Eyes: Conjunctivae are normal. Pupils are equal, round, and reactive to light.   Neck: Neck supple.   Normal range of motion.  Cardiovascular: Normal rate, regular rhythm, normal heart sounds and intact distal pulses.   Pulmonary/Chest: Breath sounds normal.   Abdominal: Abdomen is soft. Bowel sounds are normal. There is no abdominal tenderness.   Musculoskeletal:         General: Normal range of motion.      Cervical back: Normal range of motion and neck supple.     Neurological: He is alert and oriented to person, place, and time. GCS score is 15. GCS eye subscore is 4. GCS verbal subscore is 5. GCS motor subscore is 6.   Skin: Skin is warm and dry. Capillary refill takes less than 2 seconds.   Psychiatric: He has a normal mood and affect.         ED Course   Ear Wax Removal    Date/Time: 3/29/2022 5:47 PM  Performed by: Kaycee Frazier PA-C  Authorized by: Lilliam Norton MD     Anesthesia:  Local Anesthetic: none  Medication Used: Debrox.  Location details: left ear  Procedure type: irrigation Cerumen Removal Results: Cerumen completely removed.  Patient tolerance: Patient tolerated the procedure well with no immediate complications        Labs Reviewed - No data to display       Imaging Results    None          Medications - No data to display  Medical Decision Making:   History:   Old Medical Records: I decided to obtain old medical records.  Old Records Summarized: records from previous admission(s).       APC / Resident Notes:   Pt presents  to Ed for left ear fullness. Found to have impacted cerumen in left ear. Medicated with debrox and irrigated successfully with no complications. Pt states he feels better after irrigation. Will discharge home and advised to FU with PCP as needed.                 Clinical Impression:   Final diagnoses:  [H61.22] Impacted cerumen of left ear (Primary)                 Kaycee Frazier PA-C  03/29/22 1835

## 2022-06-02 DIAGNOSIS — G89.29 CHRONIC PAIN OF LEFT ANKLE: Primary | ICD-10-CM

## 2022-06-02 DIAGNOSIS — M25.572 CHRONIC PAIN OF LEFT ANKLE: Primary | ICD-10-CM

## 2022-06-14 ENCOUNTER — CLINICAL SUPPORT (OUTPATIENT)
Dept: REHABILITATION | Facility: HOSPITAL | Age: 42
End: 2022-06-14
Payer: MEDICAID

## 2022-06-14 DIAGNOSIS — R26.2 DIFFICULTY IN WALKING: ICD-10-CM

## 2022-06-14 DIAGNOSIS — R29.898 ANKLE WEAKNESS: ICD-10-CM

## 2022-06-14 DIAGNOSIS — M25.572 ACUTE LEFT ANKLE PAIN: Primary | ICD-10-CM

## 2022-06-14 DIAGNOSIS — M25.672 DECREASED RANGE OF MOTION OF LEFT ANKLE: ICD-10-CM

## 2022-06-14 PROCEDURE — 97162 PT EVAL MOD COMPLEX 30 MIN: CPT

## 2022-06-14 PROCEDURE — 97110 THERAPEUTIC EXERCISES: CPT
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
## 2022-07-01 ENCOUNTER — DOCUMENTATION ONLY (OUTPATIENT)
Dept: REHABILITATION | Facility: HOSPITAL | Age: 42
End: 2022-07-01
Payer: MEDICAID

## 2022-07-01 NOTE — PROGRESS NOTES
Physical Therapy: No show/Cancellation of Visit  Date: 07/01/2022    Patient cancelled today's PT appointment. Reason for cancellation:   Pt was called to confirm his appointment today and then reported he would be unable to make today's appointment. He also reported he will be unable to make next Tuesdays appointment (7/5/2022) due to a doctors appointment at that time. Explained to patient that this is his 6th consecutive no show/cancelation and he would have to be removed from the schedule if he continues to cancel/no show . Pt reports understanding and he verbally confirms plans to attend his next appointment on 7/7/2022 .     Initial Evaluation: 6/14/2022  Plan of Care Explanation: 7/19/2022  Cancel: 4  No show: 2    Therapist: Radha Arias, PTA

## 2022-07-08 ENCOUNTER — DOCUMENTATION ONLY (OUTPATIENT)
Dept: REHABILITATION | Facility: HOSPITAL | Age: 42
End: 2022-07-08
Payer: MEDICAID

## 2022-07-08 NOTE — PROGRESS NOTES
Pt has not attended therapy since his Evaluation. Pt has been contacted by the clinic about his visits but has not attended since. Per clinic policy, pt's visits are cancelled.     No Shows: 2   Cancels: 3     Safia Chakraborty, PT, DPT, CLT

## 2022-12-14 ENCOUNTER — HOSPITAL ENCOUNTER (EMERGENCY)
Facility: HOSPITAL | Age: 42
Discharge: HOME | End: 2022-12-14
Attending: EMERGENCY MEDICINE
Payer: MEDICAID

## 2022-12-14 VITALS
HEART RATE: 70 BPM | SYSTOLIC BLOOD PRESSURE: 122 MMHG | WEIGHT: 135 LBS | DIASTOLIC BLOOD PRESSURE: 82 MMHG | RESPIRATION RATE: 16 BRPM | BODY MASS INDEX: 22.47 KG/M2 | TEMPERATURE: 98 F | OXYGEN SATURATION: 98 %

## 2022-12-14 DIAGNOSIS — H61.22 IMPACTED CERUMEN OF LEFT EAR: Primary | ICD-10-CM

## 2022-12-14 PROCEDURE — 99282 PR EMERGENCY DEPT VISIT,LEVEL II: ICD-10-PCS | Mod: 25,,, | Performed by: PHYSICIAN ASSISTANT

## 2022-12-14 PROCEDURE — 25000003 PHARM REV CODE 250: Performed by: PHYSICIAN ASSISTANT

## 2022-12-14 PROCEDURE — 69209 REMOVE IMPACTED EAR WAX UNI: CPT | Mod: 50,,, | Performed by: PHYSICIAN ASSISTANT

## 2022-12-14 PROCEDURE — 69209 REMOVE IMPACTED EAR WAX UNI: CPT | Mod: 50

## 2022-12-14 PROCEDURE — 99282 EMERGENCY DEPT VISIT SF MDM: CPT | Mod: 25,,, | Performed by: PHYSICIAN ASSISTANT

## 2022-12-14 PROCEDURE — 69209 PR REMOVAL IMPACTED CERUMEN USING IRRIGATION/LAVAGE, UNILATERAL: ICD-10-PCS | Mod: 50,,, | Performed by: PHYSICIAN ASSISTANT

## 2022-12-14 PROCEDURE — 99283 EMERGENCY DEPT VISIT LOW MDM: CPT | Mod: 25

## 2022-12-14 RX ADMIN — CARBAMIDE PEROXIDE 6.5% 5 DROP: 6.5 LIQUID AURICULAR (OTIC) at 05:12

## 2022-12-14 NOTE — ED NOTES
Patient identifiers verified and correct for Mr Padron  C/C:Left ear discomfort. SEE NN  APPEARANCE: awake and alert in NAD. PAIN  0/10  SKIN: warm, dry and intact. No breakdown or bruising.  MUSCULOSKELETAL: Patient moving all extremities spontaneously, no obvious swelling or deformities noted. Ambulates independently.  RESPIRATORY: Denies shortness of breath.Respirations unlabored.   CARDIAC: Denies CP, 2+ distal pulses; no peripheral edema  ABDOMEN: S/ND/NT, Denies nausea  : voids spontaneously, denies difficulty  Neurologic: AAO x 4; follows commands equal strength in all extremities; denies numbness/tingling. Denies dizziness  Denies weakness

## 2022-12-14 NOTE — ED PROVIDER NOTES
"Encounter Date: 12/14/2022       History     Chief Complaint   Patient presents with    Otalgia     Left ear pain and "stopped up"     42-year-old male with history of seizure disorder presents to the ED for fullness in his left ear.  Reports fullness in his left ear and decreased hearing which started today.  Reports he frequently has a lot of ear wax and has been trying peroxide in his ears without relief.  Denies any ear pain, or fevers/chills.    The history is provided by the patient.   Review of patient's allergies indicates:  No Known Allergies  Past Medical History:   Diagnosis Date    Seizure      Past Surgical History:   Procedure Laterality Date    OPEN REDUCTION AND INTERNAL FIXATION (ORIF) OF FRACTURE OF CALCANEUS Left 4/18/2019    Procedure: ORIF, FRACTURE, CALCANEUS fracture dislocation;  Surgeon: Ander Mooney MD;  Location: [address];  Service: Orthopedics;  Laterality: Left;    OPEN REDUCTION AND INTERNAL FIXATION (ORIF) OF INJURY OF ANKLE Left 4/25/2019    Procedure: ORIF, ANKLE LEFT;  Surgeon: Goldy Williamson MD;  Location: [address];  Service: Orthopedics;  Laterality: Left;     History reviewed. No pertinent family history.  Social History     Tobacco Use    Smoking status: Never    Smokeless tobacco: Never   Substance Use Topics    Alcohol use: No    Drug use: No     Review of Systems   Constitutional:  Negative for chills and fever.   HENT:  Negative for ear pain and sore throat.    Respiratory:  Negative for cough and shortness of breath.    Cardiovascular:  Negative for chest pain.   Gastrointestinal:  Negative for abdominal pain.   Genitourinary:  Negative for difficulty urinating and dysuria.   Musculoskeletal: Negative.    Skin: Negative.    Neurological:  Negative for weakness.   Psychiatric/Behavioral:  Negative for confusion.      Physical Exam     Initial Vitals [12/14/22 1651]   BP Pulse Resp Temp SpO2   122/82 70 16 97.9 °F (36.6 °C) 98 %      MAP       --     "     Physical Exam    Nursing note and vitals reviewed.  Constitutional: He appears well-developed and well-nourished. He is not diaphoretic. No distress.   HENT:   Head: Normocephalic and atraumatic.   Bilateral cerumen impaction.  No pain with manipulation.  No mastoid tenderness.   Eyes: Conjunctivae are normal. Pupils are equal, round, and reactive to light.   Neck: Neck supple.   Normal range of motion.  Cardiovascular:  Normal rate, regular rhythm, normal heart sounds and intact distal pulses.           Pulmonary/Chest: Breath sounds normal.   Abdominal: Abdomen is soft. Bowel sounds are normal. There is no abdominal tenderness.   Musculoskeletal:         General: Normal range of motion.      Cervical back: Normal range of motion and neck supple.     Neurological: He is alert and oriented to person, place, and time. GCS score is 15. GCS eye subscore is 4. GCS verbal subscore is 5. GCS motor subscore is 6.   Skin: Skin is warm and dry. Capillary refill takes less than 2 seconds.   Psychiatric: He has a normal mood and affect.       ED Course   Ear Wax Removal    Date/Time: 12/14/2022 5:23 PM  Performed by: Kaycee Frazier PA-C  Authorized by: Carisa Muller MD     Anesthesia:  Local Anesthetic: none  Medication Used: Debrox.  Location details: both ears  Procedure type: irrigation Cerumen Removal Results: Cerumen completely removed.  Patient tolerance: Patient tolerated the procedure well with no immediate complications      Labs Reviewed   HIV 1 / 2 ANTIBODY   HEPATITIS C ANTIBODY          Imaging Results    None          Medications   carbamide peroxide 6.5 % otic solution 5 drop (5 drops Left Ear Given 12/14/22 6528)     Medical Decision Making:   History:   Old Medical Records: I decided to obtain old medical records.  Initial Assessment:   Presents ED for 1 day of left ear fullness.  Differential Diagnosis:   Cerumen impaction, mastoiditis, otitis media, otitis externa  ED Management:  Bilateral cerumen  impactions on exam.  No tenderness to the mastoid process or overlying skin changes doubt mastoiditis.  Left ear wax was soft and with Debrox and irrigated with warm water successively.  TM within normal limits bilaterally.  Patient tolerated procedure well.  Will discharge home.  Advised to avoid Q-tip use.                        Clinical Impression:   Final diagnoses:  [H61.22] Impacted cerumen of left ear (Primary)        ED Disposition Condition    Discharge Stable          ED Prescriptions    None       Follow-up Information       Follow up With Specialties Details Why Contact Info    Manuela Sousa MD Internal Medicine  As needed [address]  [place] [address]  [phone]               Kaycee Frazier PA-C  12/14/22 3785

## 2023-05-30 NOTE — TELEPHONE ENCOUNTER
----- Message from Matty Hernandez sent at 6/5/2019  2:34 PM CDT -----  Contact: [employer] - HR  Needs Advice    Reason for call: Need something stating the time frame in which the patient will be out of work        Communication Preference: Office  ext [phone]  Fax     Additional Information: n/a     Ear Star Wedge Flap Text: The defect edges were debeveled with a #15 blade scalpel.  Given the location of the defect and the proximity to free margins (helical rim) an ear star wedge flap was deemed most appropriate. Using a sterile surgical marker, the appropriate flap was drawn incorporating the defect and placing the expected incisions between the helical rim and antihelix where possible.  The area thus outlined was incised through and through with a #15 scalpel blade. Following this, the designed flap was carried over into the primary defect and sutured into place.

## 2023-06-18 ENCOUNTER — HOSPITAL ENCOUNTER (EMERGENCY)
Facility: HOSPITAL | Age: 43
Discharge: HOME | End: 2023-06-18
Attending: EMERGENCY MEDICINE
Payer: MEDICAID

## 2023-06-18 VITALS
DIASTOLIC BLOOD PRESSURE: 68 MMHG | OXYGEN SATURATION: 97 % | HEART RATE: 70 BPM | SYSTOLIC BLOOD PRESSURE: 118 MMHG | RESPIRATION RATE: 14 BRPM | TEMPERATURE: 98 F

## 2023-06-18 DIAGNOSIS — H61.21 IMPACTED CERUMEN OF RIGHT EAR: Primary | ICD-10-CM

## 2023-06-18 PROCEDURE — 99284 EMERGENCY DEPT VISIT MOD MDM: CPT | Mod: 25,,, | Performed by: EMERGENCY MEDICINE

## 2023-06-18 PROCEDURE — 69209 REMOVE IMPACTED EAR WAX UNI: CPT | Mod: RT

## 2023-06-18 PROCEDURE — 99283 EMERGENCY DEPT VISIT LOW MDM: CPT

## 2023-06-18 PROCEDURE — 99284 PR EMERGENCY DEPT VISIT,LEVEL IV: ICD-10-PCS | Mod: 25,,, | Performed by: EMERGENCY MEDICINE

## 2023-06-18 NOTE — ED TRIAGE NOTES
Chief Complaint   Patient presents with    Otalgia     Pain in R ear that began yesterday     APPEARANCE: No acute distress.    NEURO: Awake, alert, appropriate for age  HEENT: Head symmetrical. No obvious deformity  RESPIRATORY: Airway is open and patent. Respirations are spontaneous on room air.   NEUROVASCULAR: All extremities are warm and pink with capillary refill less than 3 seconds.   MUSCULOSKELETAL: Moves all extremities, wiggling toes and moving hands.   SKIN: Warm and dry, adequate turgor, mucus membranes moist and pink     Will continue to monitor.

## 2023-06-18 NOTE — ED PROVIDER NOTES
Encounter Date: 6/18/2023       History     Chief Complaint   Patient presents with    Otalgia     Pain in R ear that began yesterday     Patient is a 42-year-old male presenting to the ED for complaints of right ear pain that began yesterday.  Says that this is secondary to cerumen impaction, which he has had in the past frequently.  No associated fevers. No recent swimming or hx of diabetes.    Review of patient's allergies indicates:  No Known Allergies  Past Medical History:   Diagnosis Date    Seizure      Past Surgical History:   Procedure Laterality Date    OPEN REDUCTION AND INTERNAL FIXATION (ORIF) OF FRACTURE OF CALCANEUS Left 4/18/2019    Procedure: ORIF, FRACTURE, CALCANEUS fracture dislocation;  Surgeon: Ander Mooney MD;  Location: [facility] OR [address] [place] [address];  Service: Orthopedics;  Laterality: Left;    OPEN REDUCTION AND INTERNAL FIXATION (ORIF) OF INJURY OF ANKLE Left 4/25/2019    Procedure: ORIF, ANKLE LEFT;  Surgeon: Goldy Williamson MD;  Location: [facility] OR [address] [place] [address];  Service: Orthopedics;  Laterality: Left;     History reviewed. No pertinent family history.  Social History     Tobacco Use    Smoking status: Never    Smokeless tobacco: Never   Substance Use Topics    Alcohol use: No    Drug use: No       Physical Exam     Initial Vitals [06/18/23 0429]   BP Pulse Resp Temp SpO2   118/68 70 14 97.7 °F (36.5 °C) 97 %      MAP       --         Physical Exam    Nursing note and vitals reviewed.  Constitutional: He appears well-developed and well-nourished. He is not diaphoretic. No distress.   Well-appearing. Speaking full sentences. No acute distress.   HENT:   Head: Normocephalic and atraumatic.   Right Ear: External ear normal.   Left Ear: External ear normal.   Cerumen impaction to right ear.  There is no mastoid erythema or tenderness to palpation.   Neck: Neck supple.   Cardiovascular:  Normal rate, regular rhythm, normal heart sounds and intact distal pulses.           Pulmonary/Chest: Breath sounds  normal. No respiratory distress. He has no wheezes. He has no rhonchi. He has no rales.   Abdominal: Abdomen is soft. He exhibits no distension. There is no abdominal tenderness. There is no rebound and no guarding.   Musculoskeletal:      Cervical back: Neck supple.     Neurological: He is alert and oriented to person, place, and time. GCS score is 15. GCS eye subscore is 4. GCS verbal subscore is 5. GCS motor subscore is 6.   Skin: Skin is warm. Capillary refill takes less than 2 seconds. No rash noted.   Psychiatric: He has a normal mood and affect.       ED Course   Ear Wax Removal    Date/Time: 6/18/2023 7:42 AM  Performed by: Elia Chacon MD  Authorized by: Elia Chacon MD     Anesthesia:  Local Anesthetic: none  Location details: right ear  Procedure type: irrigation Cerumen Removal Results: Cerumen partially removed.  Patient tolerance: Patient tolerated the procedure well with no immediate complications      Labs Reviewed - No data to display       Imaging Results    None          Medications - No data to display  Medical Decision Making:   History:   Old Medical Records: I decided to obtain old medical records.  Initial Assessment:   Emergent evaluation of right ear pain.  He is afebrile and hemodynamically stable.  Differential Diagnosis:   Cerumen impaction, unlikely AOM, unlikely otitis externa, unlikely mastoiditis  ED Management:  Presentation consistent with right ear pain secondary to cerumen impaction.  Cerumen partially removed, please see procedure note.  Given ENT referral and counseled to use Debrox.  Given ED return precautions, and he expressed understanding.          Attending Attestation:   Physician Attestation Statement for Resident:  As the supervising MD   Physician Attestation Statement: I have personally seen and examined this patient.   I agree with the above history.  -:   As the supervising MD I agree with the above PE.   -: Right ear cerumen impaction   As the  supervising MD I agree with the above treatment, course, plan, and disposition.   I was personally present during the entire procedure.                             Clinical Impression:   Final diagnoses:  [H61.21] Impacted cerumen of right ear (Primary)        ED Disposition Condition    Discharge Stable          ED Prescriptions       Medication Sig Dispense Start Date End Date Auth. Provider    carbamide peroxide (DEBROX) 6.5 % otic solution Place 5 drops into both ears as needed. 15 mL 6/18/2023 -- Luisito Carpenter MD          Follow-up Information       Follow up With Specialties Details Why Contact Info Additional Information    Murtaza Ruiz - Earnosethroashayy [facility] Otolaryngology Schedule an appointment as soon as possible for a visit in 2 days As needed [id number]  [facility] [address] [phone] [facility] - Main Building, 4th Floor Please park in [facility] and use Clinic elevator             Luisito Carpenter MD  Resident  06/18/23 1364       Elia Chacon MD  06/18/23 0440

## 2023-11-22 ENCOUNTER — HOSPITAL ENCOUNTER (EMERGENCY)
Facility: HOSPITAL | Age: 43
Discharge: HOME | End: 2023-11-22
Attending: EMERGENCY MEDICINE
Payer: MEDICARE

## 2023-11-22 VITALS
OXYGEN SATURATION: 98 % | TEMPERATURE: 98 F | HEIGHT: 65 IN | WEIGHT: 135 LBS | BODY MASS INDEX: 22.49 KG/M2 | RESPIRATION RATE: 14 BRPM | DIASTOLIC BLOOD PRESSURE: 85 MMHG | HEART RATE: 72 BPM | SYSTOLIC BLOOD PRESSURE: 142 MMHG

## 2023-11-22 DIAGNOSIS — H61.22 IMPACTED CERUMEN OF LEFT EAR: Primary | ICD-10-CM

## 2023-11-22 PROCEDURE — 25000003 PHARM REV CODE 250: Performed by: PHYSICIAN ASSISTANT

## 2023-11-22 PROCEDURE — 99282 EMERGENCY DEPT VISIT SF MDM: CPT | Mod: 25

## 2023-11-22 PROCEDURE — 69210 REMOVE IMPACTED EAR WAX UNI: CPT | Mod: LT

## 2023-11-22 RX ORDER — DOCUSATE SODIUM 50 MG/5ML
100 LIQUID ORAL
Status: COMPLETED | OUTPATIENT
Start: 2023-11-22 | End: 2023-11-22

## 2023-11-22 RX ADMIN — DOCUSATE SODIUM 100 MG: 50 LIQUID ORAL at 05:11

## 2023-11-22 NOTE — ED TRIAGE NOTES
Pt. Is a 43 yr old -American male presenting tot he ED with hearing loss in the left ear.  Slight pain noted.  No trauma to the ear.  Pt. Has had this pain in the past and says it feels similar to the time he had ear wax build up.

## 2023-11-22 NOTE — ED PROVIDER NOTES
Encounter Date: 11/22/2023       History     Chief Complaint   Patient presents with    Otalgia     Left ear pain and congestion onset yesterday     43-year-old male with history of seizure presents to the emergency department with chief complaint of left ear fullness and decreased hearing that he noticed a couple of days ago.  No significant ear pain.  No congestion, sore throat, fever, cough.  Symptoms feel similar to cerumen impaction in the past.  He has been to the emergency department multiple times for cerumen removal.  He has never seen ENT.  He uses Q-tips but denies ear trauma.  Denies other worsening or alleviating factors.      Review of patient's allergies indicates:  No Known Allergies  Past Medical History:   Diagnosis Date    Seizure      Past Surgical History:   Procedure Laterality Date    OPEN REDUCTION AND INTERNAL FIXATION (ORIF) OF FRACTURE OF CALCANEUS Left 4/18/2019    Procedure: ORIF, FRACTURE, CALCANEUS fracture dislocation;  Surgeon: Ander Mooney MD;  Location: [address];  Service: Orthopedics;  Laterality: Left;    OPEN REDUCTION AND INTERNAL FIXATION (ORIF) OF INJURY OF ANKLE Left 4/25/2019    Procedure: ORIF, ANKLE LEFT;  Surgeon: Goldy Williamson MD;  Location: [address];  Service: Orthopedics;  Laterality: Left;     History reviewed. No pertinent family history.  Social History     Tobacco Use    Smoking status: Never    Smokeless tobacco: Never   Substance Use Topics    Alcohol use: No    Drug use: No     Review of Systems   HENT:          Ear fullness and decreased hearing       Physical Exam     Initial Vitals [11/22/23 1616]   BP Pulse Resp Temp SpO2   133/85 68 18 97.9 °F (36.6 °C) 98 %      MAP       --         Physical Exam    Vitals reviewed.  Constitutional: He appears well-developed and well-nourished. He is not diaphoretic. No distress.   HENT:   Head: Normocephalic and atraumatic.   Mouth/Throat: Oropharynx is clear and moist.   Some cerumen in the  right external auditory canal, however not completely impacted.  Left ear with complete cerumen impaction.  Unable to visualize the left TM.  No mastoid tenderness to palpation bilaterally.   Eyes: EOM are normal. Pupils are equal, round, and reactive to light.   Neck: Neck supple.   Normal range of motion.  Cardiovascular:  Normal rate.           Pulmonary/Chest: No respiratory distress.   Abdominal: He exhibits no distension.   Musculoskeletal:         General: Normal range of motion.      Cervical back: Normal range of motion and neck supple.     Neurological: He is alert and oriented to person, place, and time. He has normal strength. GCS score is 15. GCS eye subscore is 4. GCS verbal subscore is 5. GCS motor subscore is 6.   Skin: Skin is warm and dry. Capillary refill takes less than 2 seconds.   Psychiatric: He has a normal mood and affect. His behavior is normal. Judgment and thought content normal.         ED Course   Ear Wax Removal    Date/Time: 11/22/2023 5:30 PM    Performed by: Yue Hernandez PA-C  Authorized by: Liu Curtis MD  Ceruminolytics applied prior to the procedure.  Medication Used: Other.  Location details: left ear  Procedure type: curette (and irrigation)  Cerumen Removal Results: Cerumen partially removed.  Patient tolerance: Patient tolerated the procedure well with no immediate complications        Labs Reviewed   HIV 1 / 2 ANTIBODY   HEPATITIS C ANTIBODY          Imaging Results    None          Medications   docusate 50 mg/5 mL liquid 100 mg (100 mg Otic Given 11/22/23 1788)     Medical Decision Making  Emergent evaluation of a 43 y.o. male presenting to the emergency department complaining of left ear fullness and decreased hearing that started a few days ago.  Similar symptoms in the past associated with cerumen impaction.  Patient is afebrile, hemodynamically stable, and non toxic appearing.   Will order Colace and attempt irrigation.     Differential diagnosis  "includes but isn't limited to cerumen impaction, otitis externa, otitis media, TM perforation, mastoiditis.     Colace was instilled into the left ear.  The left ear was then irrigated with a little cerumen removed. I have also used a curette to remove more cerumen. There is still some remaining in the external auditory canal. I will stop at this point to refrain from damaging the canal. The patient denies significant improvement of his symptoms. He says "y'all didn't do nothing but make it worse." I explain to him that he will need to follow up with ENT. He is upset about this and leaves the exam room.     The patient was instructed to follow up with ENT or to return to the emergency department for worsening symptoms. All questions answered.                                    Clinical Impression:  Final diagnoses:  [H61.22] Impacted cerumen of left ear (Primary)          ED Disposition Condition    Discharge Stable          ED Prescriptions    None       Follow-up Information       Follow up With Specialties Details Why Contact Info Additional Information    [facility] - Emergency Dept Emergency Medicine Go to  If symptoms worsen [address] [facility] [id number] [phone]     [facility] Otolaryngology Schedule an appointment as soon as possible for a visit in 1 week  [address] [facility] [id number] [phone] [facility] - Main Building, 4th Floor Please park in [facility] and use Clinic elevator             Yue Hernadnez PA-C  11/22/23 1906    "

## 2023-11-23 NOTE — DISCHARGE INSTRUCTIONS
Follow-up with ENT or return to the emergency department sooner for any new or worsening symptoms.

## 2024-01-30 ENCOUNTER — OFFICE VISIT (OUTPATIENT)
Dept: OTOLARYNGOLOGY | Facility: CLINIC | Age: 44
End: 2024-01-30
Payer: MEDICARE

## 2024-01-30 VITALS
SYSTOLIC BLOOD PRESSURE: 120 MMHG | HEART RATE: 71 BPM | WEIGHT: 199.75 LBS | DIASTOLIC BLOOD PRESSURE: 74 MMHG | BODY MASS INDEX: 33.24 KG/M2 | OXYGEN SATURATION: 97 %

## 2024-01-30 DIAGNOSIS — H91.93 BILATERAL HEARING LOSS, UNSPECIFIED HEARING LOSS TYPE: ICD-10-CM

## 2024-01-30 DIAGNOSIS — H61.21 IMPACTED CERUMEN OF RIGHT EAR: ICD-10-CM

## 2024-01-30 DIAGNOSIS — J34.2 NASAL SEPTAL DEVIATION: ICD-10-CM

## 2024-01-30 DIAGNOSIS — H93.12 TINNITUS OF LEFT EAR: ICD-10-CM

## 2024-01-30 DIAGNOSIS — H61.23 BILATERAL IMPACTED CERUMEN: Primary | ICD-10-CM

## 2024-01-30 PROCEDURE — 99999 PR PBB SHADOW E&M-EST. PATIENT-LVL III: CPT | Mod: PBBFAC,,, | Performed by: SPECIALIST

## 2024-01-30 PROCEDURE — 99204 OFFICE O/P NEW MOD 45 MIN: CPT | Mod: 25,S$PBB,, | Performed by: SPECIALIST

## 2024-01-30 PROCEDURE — 69210 REMOVE IMPACTED EAR WAX UNI: CPT | Mod: PBBFAC,PN | Performed by: SPECIALIST

## 2024-01-30 PROCEDURE — 99213 OFFICE O/P EST LOW 20 MIN: CPT | Mod: 25,PBBFAC,PN | Performed by: SPECIALIST

## 2024-01-30 NOTE — PROCEDURES
"Ear Cerumen Removal    Date/Time: 1/30/2024 10:00 AM    Performed by: CONCHITA Tong MD  Authorized by: CONCHITA Tong MD    Time out: Immediately prior to procedure a "time out" was called to verify the correct patient, procedure, equipment, support staff and site/side marked as required.    Consent Done?:  Yes (Verbal)    Local anesthetic:  None  Location details:  Both ears  Procedure type comment:  Right ear- wire loop, 8 Fr suction and bayonet forceps: left ear- same plus irrigation  Cerumen  Removal Results:  Cerumen completely removed  Patient tolerance:  Patient tolerated the procedure well with no immediate complications    "

## 2024-01-30 NOTE — PROGRESS NOTES
Subjective:       Patient ID: Charo Padron is a 43 y.o. male.    Chief Complaint: No chief complaint on file.      The patient is referred by Dr. Luisito Carpenter for blockage in both ears.  He is not having drainage.  His is having some decreased hearing and mild tinnitus on the left.  He does not use Q-tips or ear buds in his ears.  He is not having any otorrhea or otalgia.  He does have a problem with recurring cerumen impactions.          Review of Systems     Constitutional: Negative for appetite change, chills, fatigue, fever and unexpected weight loss.      HENT: Positive for ear discharge, ear infection and hearing loss.  Negative for ear pain (Blockage in both ears), facial swelling, mouth sores, nosebleeds, postnasal drip, ringing in the ears, runny nose, sinus infection, sinus pressure, sore throat, stuffy nose, tonsil infection, dental problems, trouble swallowing and voice change.      Eyes:  Negative for change in eyesight, eye drainage, eye itching and photophobia.     Respiratory:  Negative for cough, shortness of breath, sleep apnea, snoring and wheezing.      Cardiovascular:  Negative for chest pain, foot swelling, irregular heartbeat and swollen veins.     Gastrointestinal:  Negative for abdominal pain, acid reflux, constipation, diarrhea, heartburn and vomiting.     Genitourinary: Negative for difficulty urinating, sexual problems and frequent urination.     Musc: Positive for aching joints. Negative for aching muscles, back pain and neck pain. History of left ankle fracture    Skin: Negative for rash.     Allergy: Negative for food allergies and seasonal allergies.     Endocrine: Negative for cold intolerance and heat intolerance.      Neurological: Negative for dizziness, headaches, light-headedness, seizures and tremors.      Hematologic: Negative for bruises/bleeds easily and swollen glands.      Psychiatric: Negative for decreased concentration, depression, nervous/anxious and sleep  disturbance.                Objective:      Physical Exam  Vitals and nursing note reviewed.   Constitutional:       General: He is awake.      Appearance: Normal appearance. He is well-developed, well-groomed and overweight.   HENT:      Head: Normocephalic.      Jaw: There is normal jaw occlusion.      Salivary Glands: Right salivary gland is not diffusely enlarged or tender. Left salivary gland is not diffusely enlarged or tender.      Right Ear: Hearing, ear canal and external ear normal. There is impacted cerumen. Tympanic membrane is retracted.      Left Ear: Hearing, ear canal and external ear normal. There is impacted cerumen. Tympanic membrane is retracted.      Nose: Septal deviation, mucosal edema (cyanotic, boggy inferior turbinates bilaterally) and rhinorrhea (clear mucus bilaterally) present. No nasal deformity. Rhinorrhea is clear.      Right Turbinates: Enlarged and pale.      Left Turbinates: Enlarged and pale.      Mouth/Throat:      Lips: No lesions.      Mouth: No oral lesions.      Dentition: No gum lesions.      Tongue: No lesions.      Palate: No mass and lesions.      Pharynx: Oropharynx is clear. Uvula midline.   Eyes:      General: Lids are normal.         Right eye: No discharge.         Left eye: No discharge.      Conjunctiva/sclera:      Right eye: Right conjunctiva is injected. No exudate.     Left eye: Left conjunctiva is injected. No exudate.     Pupils: Pupils are equal, round, and reactive to light.   Neck:      Thyroid: No thyroid mass or thyromegaly.      Trachea: Trachea normal. No tracheal deviation.   Cardiovascular:      Rate and Rhythm: Normal rate and regular rhythm.      Pulses: Normal pulses.      Heart sounds: Normal heart sounds.   Pulmonary:      Effort: Pulmonary effort is normal.      Breath sounds: Normal breath sounds. No stridor. No decreased breath sounds, wheezing, rhonchi or rales.   Abdominal:      General: Bowel sounds are normal.      Palpations: Abdomen is  soft.      Tenderness: There is no abdominal tenderness.   Musculoskeletal:         General: Normal range of motion.      Cervical back: Normal range of motion and neck supple. No muscular tenderness.   Lymphadenopathy:      Head:      Right side of head: No submental, submandibular, preauricular, posterior auricular or occipital adenopathy.      Left side of head: No submental, submandibular, preauricular, posterior auricular or occipital adenopathy.      Cervical: No cervical adenopathy.   Skin:     General: Skin is warm and dry.      Findings: No petechiae or rash.      Nails: There is no clubbing.   Neurological:      Mental Status: He is alert and oriented to person, place, and time.      Cranial Nerves: No cranial nerve deficit.      Sensory: No sensory deficit.      Gait: Gait normal.   Psychiatric:         Speech: Speech normal.         Behavior: Behavior normal. Behavior is cooperative.         Thought Content: Thought content normal.         Judgment: Judgment normal.         Procedurre-cerumen impactions removed bilaterally using wire loop, 8 Argentine suction and bayonet forceps bilaterally and irrigation on the left ear only.  The patient tolerated procedure well was discharged post procedure    Assessment:       1. Bilateral impacted cerumen    2. Impacted cerumen of right ear    3. Bilateral hearing loss, unspecified hearing loss type    4. Tinnitus of left ear    5. Nasal septal deviation        Plan:       I will schedule patient for a comprehensive auditory evaluation.  I will telephone results to him unless an in-person explanation is required.  Regarding his recurring cerumen impactions I will recheck him in 6 months.                DISCLAIMER: This note was prepared with Cue voice recognition transcription software. Garbled syntax, mangled pronouns, and other bizarre constructions may be attributed to that software system. While efforts were made to correct any mistakes made by this voice  recognition program, some errors and/or omissions may remain in the note that were missed when the note was originally created.

## (undated) DEVICE — DRESSING GAUZE 6PLY 4X4

## (undated) DEVICE — SWABSTICK BENZOIN 4 IN

## (undated) DEVICE — TOURNIQUET SB QC DP 34X4IN

## (undated) DEVICE — STOCKINETTE 6 X 25 YARDS

## (undated) DEVICE — STOCKINET TUBULAR 1 PLY 6X60IN

## (undated) DEVICE — DRAPE STERI U-SHAPED 47X51IN

## (undated) DEVICE — BANDAGE DERMACEA STRETCH 4X1IN

## (undated) DEVICE — GAUZE SPONGE 4X4 12PLY

## (undated) DEVICE — WIRE 2.8 X 300 MM THREADED
Type: IMPLANTABLE DEVICE | Site: ANKLE | Status: NON-FUNCTIONAL
Removed: 2019-04-18

## (undated) DEVICE — Device

## (undated) DEVICE — SUT ETHILON 3/0 18IN PS-1

## (undated) DEVICE — SEE MEDLINE ITEM 157131

## (undated) DEVICE — SEE MEDLINE ITEM 152515

## (undated) DEVICE — SUT VICRYL 3-0 27 SH

## (undated) DEVICE — BLADE SURG CARBON STEEL #10

## (undated) DEVICE — SUT 2-0 VICRYL / SH (J417)

## (undated) DEVICE — GAUZE DERMACEA 3 PLY 4IN 4YD

## (undated) DEVICE — MARKER SKIN STND TIP BLUE BARR

## (undated) DEVICE — BIT DRILL CANNL SS 5.0MM 300/2

## (undated) DEVICE — PENCIL ROCKER SWITCH 10FT CORD

## (undated) DEVICE — SEE MEDLINE ITEM 157150

## (undated) DEVICE — ELECTRODE REM PLYHSV RETURN 9

## (undated) DEVICE — BANDAGE ACE DOUBLE STER 6IN

## (undated) DEVICE — TRAY MINOR ORTHO

## (undated) DEVICE — TAPE SILK 3IN

## (undated) DEVICE — TAPE SURG DURAPORE 2 X10YD

## (undated) DEVICE — SUT VICRYL PLUS 2-0 CT1 18

## (undated) DEVICE — SEE MEDLINE ITEM 146298

## (undated) DEVICE — DRAPE C ARM 42 X 120 10/BX

## (undated) DEVICE — DRESSING N ADH OIL EMUL 3X16

## (undated) DEVICE — SEE MEDLINE ITEM 152530

## (undated) DEVICE — SPONGE LAP 18X18 PREWASHED

## (undated) DEVICE — SUT VICRYL PLUS 0 CT1 18IN

## (undated) DEVICE — BANDAGE ACE ELASTIC 6"

## (undated) DEVICE — PAD CAST SPECIALIST STRL 4

## (undated) DEVICE — STAPLER SKIN PROXIMATE WIDE

## (undated) DEVICE — BANDAGE ESMARK 6X12

## (undated) DEVICE — SEE MEDLINE ITEM 146345

## (undated) DEVICE — SEE MEDLINE ITEM 152622

## (undated) DEVICE — DRAPE C-ARMOR EQUIPMENT COVER

## (undated) DEVICE — CATH SUCTION 10FR

## (undated) DEVICE — DRAPE PLASTIC U 60X72

## (undated) DEVICE — SUT 3-0 VICRYL SH CR/8 18

## (undated) DEVICE — DRESSING XEROFORM FOIL PK 1X8

## (undated) DEVICE — DRESSING N ADH OIL EMUL 3X8

## (undated) DEVICE — BIT BRILL 2.5

## (undated) DEVICE — PAD CAST SPECIALIST STRL 6

## (undated) DEVICE — SEE MEDLINE ITEM 152529

## (undated) DEVICE — APPLICATOR CHLORAPREP ORN 26ML